# Patient Record
Sex: FEMALE | Race: WHITE | NOT HISPANIC OR LATINO | Employment: PART TIME | URBAN - METROPOLITAN AREA
[De-identification: names, ages, dates, MRNs, and addresses within clinical notes are randomized per-mention and may not be internally consistent; named-entity substitution may affect disease eponyms.]

---

## 2017-07-24 LAB — CRC RECOMMENDATION EXT: NORMAL

## 2017-08-03 ENCOUNTER — HISTORICAL (OUTPATIENT)
Dept: ADMINISTRATIVE | Facility: HOSPITAL | Age: 52
End: 2017-08-03

## 2017-11-01 ENCOUNTER — HISTORICAL (OUTPATIENT)
Dept: ADMINISTRATIVE | Facility: HOSPITAL | Age: 52
End: 2017-11-01

## 2018-10-30 ENCOUNTER — HISTORICAL (OUTPATIENT)
Dept: INTERNAL MEDICINE | Facility: CLINIC | Age: 53
End: 2018-10-30

## 2018-10-30 LAB
ABS NEUT (OLG): 3.09 X10(3)/MCL (ref 2.1–9.2)
ALBUMIN SERPL-MCNC: 3.7 GM/DL (ref 3.4–5)
ALBUMIN/GLOB SERPL: 1 RATIO (ref 1–2)
ALP SERPL-CCNC: 97 UNIT/L (ref 45–117)
ALT SERPL-CCNC: 19 UNIT/L (ref 12–78)
AST SERPL-CCNC: 19 UNIT/L (ref 15–37)
BASOPHILS # BLD AUTO: 0.02 X10(3)/MCL
BASOPHILS NFR BLD AUTO: 0 %
BILIRUB SERPL-MCNC: 0.3 MG/DL (ref 0.2–1)
BILIRUBIN DIRECT+TOT PNL SERPL-MCNC: 0.1 MG/DL
BILIRUBIN DIRECT+TOT PNL SERPL-MCNC: 0.2 MG/DL
BUN SERPL-MCNC: 10 MG/DL (ref 7–18)
CALCIUM SERPL-MCNC: 9.1 MG/DL (ref 8.5–10.1)
CHLORIDE SERPL-SCNC: 106 MMOL/L (ref 98–107)
CHOLEST SERPL-MCNC: 179 MG/DL
CHOLEST/HDLC SERPL: 2.6 {RATIO} (ref 0–4.4)
CO2 SERPL-SCNC: 30 MMOL/L (ref 21–32)
CREAT SERPL-MCNC: 0.6 MG/DL (ref 0.6–1.3)
EOSINOPHIL # BLD AUTO: 0.21 X10(3)/MCL
EOSINOPHIL NFR BLD AUTO: 4 %
ERYTHROCYTE [DISTWIDTH] IN BLOOD BY AUTOMATED COUNT: 12.5 % (ref 11.5–14.5)
GLOBULIN SER-MCNC: 4.6 GM/ML (ref 2.3–3.5)
GLUCOSE SERPL-MCNC: 99 MG/DL (ref 74–106)
HCT VFR BLD AUTO: 39.9 % (ref 35–46)
HDLC SERPL-MCNC: 69 MG/DL
HGB BLD-MCNC: 13.2 GM/DL (ref 12–16)
IMM GRANULOCYTES # BLD AUTO: 0.02 10*3/UL
IMM GRANULOCYTES NFR BLD AUTO: 0 %
LDLC SERPL CALC-MCNC: 92 MG/DL (ref 0–130)
LYMPHOCYTES # BLD AUTO: 1.77 X10(3)/MCL
LYMPHOCYTES NFR BLD AUTO: 32 % (ref 13–40)
MCH RBC QN AUTO: 29.1 PG (ref 26–34)
MCHC RBC AUTO-ENTMCNC: 33.1 GM/DL (ref 31–37)
MCV RBC AUTO: 87.9 FL (ref 80–100)
MONOCYTES # BLD AUTO: 0.5 X10(3)/MCL
MONOCYTES NFR BLD AUTO: 9 % (ref 4–12)
NEUTROPHILS # BLD AUTO: 3.09 X10(3)/MCL
NEUTROPHILS NFR BLD AUTO: 55 X10(3)/MCL
PLATELET # BLD AUTO: 278 X10(3)/MCL (ref 130–400)
PMV BLD AUTO: 10.3 FL (ref 7.4–10.4)
POTASSIUM SERPL-SCNC: 4 MMOL/L (ref 3.5–5.1)
PROT SERPL-MCNC: 8.3 GM/DL (ref 6.4–8.2)
RBC # BLD AUTO: 4.54 X10(6)/MCL (ref 4–5.2)
SODIUM SERPL-SCNC: 141 MMOL/L (ref 136–145)
TRIGL SERPL-MCNC: 90 MG/DL
VLDLC SERPL CALC-MCNC: 18 MG/DL
WBC # SPEC AUTO: 5.6 X10(3)/MCL (ref 4.5–11)

## 2018-11-06 ENCOUNTER — HISTORICAL (OUTPATIENT)
Dept: ADMINISTRATIVE | Facility: HOSPITAL | Age: 53
End: 2018-11-06

## 2019-08-07 ENCOUNTER — HISTORICAL (OUTPATIENT)
Dept: ADMINISTRATIVE | Facility: HOSPITAL | Age: 54
End: 2019-08-07

## 2019-10-31 ENCOUNTER — HISTORICAL (OUTPATIENT)
Dept: INTERNAL MEDICINE | Facility: CLINIC | Age: 54
End: 2019-10-31

## 2019-10-31 LAB
ALBUMIN SERPL-MCNC: 3.6 GM/DL (ref 3.4–5)
ALBUMIN/GLOB SERPL: 0.8 RATIO (ref 1.1–2)
ALP SERPL-CCNC: 76 UNIT/L (ref 45–117)
ALT SERPL-CCNC: 15 UNIT/L (ref 12–78)
AST SERPL-CCNC: 15 UNIT/L (ref 15–37)
BILIRUB SERPL-MCNC: 0.4 MG/DL (ref 0.2–1)
BILIRUBIN DIRECT+TOT PNL SERPL-MCNC: 0.1 MG/DL (ref 0–0.2)
BILIRUBIN DIRECT+TOT PNL SERPL-MCNC: 0.3 MG/DL
BUN SERPL-MCNC: 16 MG/DL (ref 7–18)
CALCIUM SERPL-MCNC: 9 MG/DL (ref 8.5–10.1)
CHLORIDE SERPL-SCNC: 105 MMOL/L (ref 98–107)
CHOLEST SERPL-MCNC: 258 MG/DL
CHOLEST/HDLC SERPL: 3.2 {RATIO} (ref 0–4.4)
CO2 SERPL-SCNC: 30 MMOL/L (ref 21–32)
CREAT SERPL-MCNC: 0.6 MG/DL (ref 0.6–1.3)
GLOBULIN SER-MCNC: 4.4 GM/ML (ref 2.3–3.5)
GLUCOSE SERPL-MCNC: 95 MG/DL (ref 74–106)
HDLC SERPL-MCNC: 80 MG/DL (ref 40–59)
LDLC SERPL CALC-MCNC: 166 MG/DL
POTASSIUM SERPL-SCNC: 4.4 MMOL/L (ref 3.5–5.1)
PROT SERPL-MCNC: 8 GM/DL (ref 6.4–8.2)
SODIUM SERPL-SCNC: 138 MMOL/L (ref 136–145)
TRIGL SERPL-MCNC: 62 MG/DL
VLDLC SERPL CALC-MCNC: 12 MG/DL

## 2020-07-23 ENCOUNTER — HISTORICAL (OUTPATIENT)
Dept: LAB | Facility: HOSPITAL | Age: 55
End: 2020-07-23

## 2020-07-23 LAB
ABS NEUT (OLG): 2.63 X10(3)/MCL (ref 2.1–9.2)
ALBUMIN SERPL-MCNC: 3.4 GM/DL (ref 3.4–5)
ALBUMIN/GLOB SERPL: 0.8 RATIO (ref 1.1–2)
ALP SERPL-CCNC: 87 UNIT/L (ref 45–117)
ALT SERPL-CCNC: 18 UNIT/L (ref 12–78)
AST SERPL-CCNC: 14 UNIT/L (ref 15–37)
BASOPHILS # BLD AUTO: 0 X10(3)/MCL (ref 0–0.2)
BASOPHILS NFR BLD AUTO: 0 %
BILIRUB SERPL-MCNC: 0.5 MG/DL (ref 0.2–1)
BILIRUBIN DIRECT+TOT PNL SERPL-MCNC: 0.2 MG/DL (ref 0–0.2)
BILIRUBIN DIRECT+TOT PNL SERPL-MCNC: 0.3 MG/DL
BUN SERPL-MCNC: 12 MG/DL (ref 7–18)
CALCIUM SERPL-MCNC: 9 MG/DL (ref 8.5–10.1)
CHLORIDE SERPL-SCNC: 108 MMOL/L (ref 98–107)
CHOLEST SERPL-MCNC: 177 MG/DL
CHOLEST/HDLC SERPL: 2.5 {RATIO} (ref 0–4.4)
CO2 SERPL-SCNC: 29 MMOL/L (ref 21–32)
CREAT SERPL-MCNC: 0.5 MG/DL (ref 0.6–1.3)
EOSINOPHIL # BLD AUTO: 0.1 X10(3)/MCL (ref 0–0.9)
EOSINOPHIL NFR BLD AUTO: 3 %
ERYTHROCYTE [DISTWIDTH] IN BLOOD BY AUTOMATED COUNT: 13.2 % (ref 11.5–14.5)
GLOBULIN SER-MCNC: 4.4 GM/ML (ref 2.3–3.5)
GLUCOSE SERPL-MCNC: 101 MG/DL (ref 74–106)
HCT VFR BLD AUTO: 38.8 % (ref 35–46)
HDLC SERPL-MCNC: 70 MG/DL (ref 40–59)
HGB BLD-MCNC: 12.4 GM/DL (ref 12–16)
IMM GRANULOCYTES # BLD AUTO: 0.01 10*3/UL
IMM GRANULOCYTES NFR BLD AUTO: 0 %
LDLC SERPL CALC-MCNC: 90 MG/DL
LYMPHOCYTES # BLD AUTO: 1.5 X10(3)/MCL (ref 0.6–4.6)
LYMPHOCYTES NFR BLD AUTO: 32 %
MCH RBC QN AUTO: 28.6 PG (ref 26–34)
MCHC RBC AUTO-ENTMCNC: 32 GM/DL (ref 31–37)
MCV RBC AUTO: 89.6 FL (ref 80–100)
MONOCYTES # BLD AUTO: 0.5 X10(3)/MCL (ref 0.1–1.3)
MONOCYTES NFR BLD AUTO: 11 %
NEUTROPHILS # BLD AUTO: 2.63 X10(3)/MCL (ref 2.1–9.2)
NEUTROPHILS NFR BLD AUTO: 54 %
PLATELET # BLD AUTO: 291 X10(3)/MCL (ref 130–400)
PMV BLD AUTO: 10.4 FL (ref 7.4–10.4)
POTASSIUM SERPL-SCNC: 4.2 MMOL/L (ref 3.5–5.1)
PROT SERPL-MCNC: 7.8 GM/DL (ref 6.4–8.2)
RBC # BLD AUTO: 4.33 X10(6)/MCL (ref 4–5.2)
SODIUM SERPL-SCNC: 139 MMOL/L (ref 136–145)
TRIGL SERPL-MCNC: 83 MG/DL
VLDLC SERPL CALC-MCNC: 17 MG/DL
WBC # SPEC AUTO: 4.9 X10(3)/MCL (ref 4.5–11)

## 2020-08-18 ENCOUNTER — HISTORICAL (OUTPATIENT)
Dept: ADMINISTRATIVE | Facility: HOSPITAL | Age: 55
End: 2020-08-18

## 2021-03-15 ENCOUNTER — HISTORICAL (OUTPATIENT)
Dept: LAB | Facility: HOSPITAL | Age: 56
End: 2021-03-15

## 2021-03-15 LAB
ABS NEUT (OLG): 2.82 X10(3)/MCL (ref 2.1–9.2)
ALBUMIN SERPL-MCNC: 3.6 GM/DL (ref 3.5–5)
ALBUMIN/GLOB SERPL: 0.9 RATIO (ref 1.1–2)
ALP SERPL-CCNC: 94 UNIT/L (ref 40–150)
ALT SERPL-CCNC: 11 UNIT/L (ref 0–55)
AST SERPL-CCNC: 18 UNIT/L (ref 5–34)
BASOPHILS # BLD AUTO: 0 X10(3)/MCL (ref 0–0.2)
BASOPHILS NFR BLD AUTO: 1 %
BILIRUB SERPL-MCNC: 0.5 MG/DL
BILIRUBIN DIRECT+TOT PNL SERPL-MCNC: 0.2 MG/DL (ref 0–0.5)
BILIRUBIN DIRECT+TOT PNL SERPL-MCNC: 0.3 MG/DL (ref 0–0.8)
BUN SERPL-MCNC: 10.8 MG/DL (ref 9.8–20.1)
CALCIUM SERPL-MCNC: 9.1 MG/DL (ref 8.4–10.2)
CHLORIDE SERPL-SCNC: 104 MMOL/L (ref 98–107)
CHOLEST SERPL-MCNC: 197 MG/DL
CHOLEST/HDLC SERPL: 3 {RATIO} (ref 0–5)
CO2 SERPL-SCNC: 28 MMOL/L (ref 22–29)
CREAT SERPL-MCNC: 0.65 MG/DL (ref 0.55–1.02)
DEPRECATED CALCIDIOL+CALCIFEROL SERPL-MC: 23.3 NG/ML (ref 30–80)
EOSINOPHIL # BLD AUTO: 0.2 X10(3)/MCL (ref 0–0.9)
EOSINOPHIL NFR BLD AUTO: 4 %
ERYTHROCYTE [DISTWIDTH] IN BLOOD BY AUTOMATED COUNT: 12.7 % (ref 11.5–14.5)
GLOBULIN SER-MCNC: 4.1 GM/DL (ref 2.4–3.5)
GLUCOSE SERPL-MCNC: 107 MG/DL (ref 74–100)
HCT VFR BLD AUTO: 46.1 % (ref 35–46)
HDLC SERPL-MCNC: 65 MG/DL (ref 35–60)
HGB BLD-MCNC: 15.1 GM/DL (ref 12–16)
IMM GRANULOCYTES # BLD AUTO: 0.01 10*3/UL
IMM GRANULOCYTES NFR BLD AUTO: 0 %
LDLC SERPL CALC-MCNC: 115 MG/DL (ref 50–140)
LYMPHOCYTES # BLD AUTO: 1.4 X10(3)/MCL (ref 0.6–4.6)
LYMPHOCYTES NFR BLD AUTO: 27 %
MCH RBC QN AUTO: 29.4 PG (ref 26–34)
MCHC RBC AUTO-ENTMCNC: 32.8 GM/DL (ref 31–37)
MCV RBC AUTO: 89.9 FL (ref 80–100)
MONOCYTES # BLD AUTO: 0.6 X10(3)/MCL (ref 0.1–1.3)
MONOCYTES NFR BLD AUTO: 12 %
NEUTROPHILS # BLD AUTO: 2.82 X10(3)/MCL (ref 2.1–9.2)
NEUTROPHILS NFR BLD AUTO: 56 %
PLATELET # BLD AUTO: 249 X10(3)/MCL (ref 130–400)
PMV BLD AUTO: 10.6 FL (ref 7.4–10.4)
POTASSIUM SERPL-SCNC: 4.4 MMOL/L (ref 3.5–5.1)
PROT SERPL-MCNC: 7.7 GM/DL (ref 6.4–8.3)
RBC # BLD AUTO: 5.13 X10(6)/MCL (ref 4–5.2)
SODIUM SERPL-SCNC: 139 MMOL/L (ref 136–145)
TRIGL SERPL-MCNC: 84 MG/DL (ref 37–140)
VLDLC SERPL CALC-MCNC: 17 MG/DL
WBC # SPEC AUTO: 5 X10(3)/MCL (ref 4.5–11)

## 2021-09-16 LAB
PAP RECOMMENDATION EXT: NORMAL
PAP SMEAR: NORMAL

## 2021-09-20 ENCOUNTER — HISTORICAL (OUTPATIENT)
Dept: LAB | Facility: HOSPITAL | Age: 56
End: 2021-09-20

## 2021-09-20 LAB
ALBUMIN SERPL-MCNC: 3.7 GM/DL (ref 3.5–5)
ALBUMIN/GLOB SERPL: 0.8 RATIO (ref 1.1–2)
ALP SERPL-CCNC: 102 UNIT/L (ref 40–150)
ALT SERPL-CCNC: 15 UNIT/L (ref 0–55)
AST SERPL-CCNC: 19 UNIT/L (ref 5–34)
BILIRUB SERPL-MCNC: 0.4 MG/DL
BILIRUBIN DIRECT+TOT PNL SERPL-MCNC: 0.1 MG/DL (ref 0–0.5)
BILIRUBIN DIRECT+TOT PNL SERPL-MCNC: 0.3 MG/DL (ref 0–0.8)
BUN SERPL-MCNC: 10.7 MG/DL (ref 9.8–20.1)
CALCIUM SERPL-MCNC: 10 MG/DL (ref 8.4–10.2)
CHLORIDE SERPL-SCNC: 104 MMOL/L (ref 98–107)
CHOLEST SERPL-MCNC: 202 MG/DL
CHOLEST/HDLC SERPL: 3 {RATIO} (ref 0–5)
CO2 SERPL-SCNC: 28 MMOL/L (ref 22–29)
CREAT SERPL-MCNC: 0.68 MG/DL (ref 0.55–1.02)
DEPRECATED CALCIDIOL+CALCIFEROL SERPL-MC: 56.4 NG/ML (ref 30–80)
GLOBULIN SER-MCNC: 4.4 GM/DL (ref 2.4–3.5)
GLUCOSE SERPL-MCNC: 101 MG/DL (ref 74–100)
HDLC SERPL-MCNC: 60 MG/DL (ref 35–60)
LDLC SERPL CALC-MCNC: 121 MG/DL (ref 50–140)
POTASSIUM SERPL-SCNC: 4.5 MMOL/L (ref 3.5–5.1)
PROT SERPL-MCNC: 8.1 GM/DL (ref 6.4–8.3)
SODIUM SERPL-SCNC: 141 MMOL/L (ref 136–145)
TRIGL SERPL-MCNC: 106 MG/DL (ref 37–140)
VLDLC SERPL CALC-MCNC: 21 MG/DL

## 2021-12-08 ENCOUNTER — HISTORICAL (OUTPATIENT)
Dept: ADMINISTRATIVE | Facility: HOSPITAL | Age: 56
End: 2021-12-08

## 2022-02-23 ENCOUNTER — HISTORICAL (OUTPATIENT)
Dept: ADMINISTRATIVE | Facility: HOSPITAL | Age: 57
End: 2022-02-23

## 2022-03-25 ENCOUNTER — HISTORICAL (OUTPATIENT)
Dept: ADMINISTRATIVE | Facility: HOSPITAL | Age: 57
End: 2022-03-25

## 2022-03-25 LAB
ABS NEUT (OLG): 3.35 (ref 2.1–9.2)
ALBUMIN SERPL-MCNC: 3.6 G/DL (ref 3.5–5)
ALBUMIN/GLOB SERPL: 0.9 {RATIO} (ref 1.1–2)
ALP SERPL-CCNC: 86 U/L (ref 40–150)
ALT SERPL-CCNC: 13 U/L (ref 0–55)
APPEARANCE, UA: CLEAR
AST SERPL-CCNC: 16 U/L (ref 5–34)
BACTERIA SPEC CULT: NORMAL
BASOPHILS # BLD AUTO: 0 10*3/UL (ref 0–0.2)
BASOPHILS NFR BLD AUTO: 1 %
BILIRUB SERPL-MCNC: 0.5 MG/DL
BILIRUB UR QL STRIP: NEGATIVE
BILIRUBIN DIRECT+TOT PNL SERPL-MCNC: 0.2 (ref 0–0.5)
BILIRUBIN DIRECT+TOT PNL SERPL-MCNC: 0.3 (ref 0–0.8)
BUN SERPL-MCNC: 10.1 MG/DL (ref 9.8–20.1)
CALCIUM SERPL-MCNC: 9.3 MG/DL (ref 8.7–10.5)
CHLORIDE SERPL-SCNC: 105 MMOL/L (ref 98–107)
CHOLEST SERPL-MCNC: 184 MG/DL
CHOLEST/HDLC SERPL: 3 {RATIO} (ref 0–5)
CO2 SERPL-SCNC: 25 MMOL/L (ref 22–29)
COLOR UR: NORMAL
CREAT SERPL-MCNC: 0.68 MG/DL (ref 0.55–1.02)
EOSINOPHIL # BLD AUTO: 0.3 10*3/UL (ref 0–0.9)
EOSINOPHIL NFR BLD AUTO: 5 %
ERYTHROCYTE [DISTWIDTH] IN BLOOD BY AUTOMATED COUNT: 13.2 % (ref 11.5–14.5)
FLAG2 (OHS): 70
FLAG3 (OHS): 80
FLAGS (OHS): 80
GLOBULIN SER-MCNC: 4.1 G/DL (ref 2.4–3.5)
GLUCOSE (UA): NORMAL
GLUCOSE SERPL-MCNC: 102 MG/DL (ref 74–100)
HCT VFR BLD AUTO: 39.3 % (ref 35–46)
HDLC SERPL-MCNC: 67 MG/DL (ref 35–60)
HEMOLYSIS INTERF INDEX SERPL-ACNC: 9
HGB BLD-MCNC: 12.9 G/DL (ref 12–16)
HGB UR QL STRIP: NEGATIVE
HYALINE CASTS #/AREA URNS LPF: NORMAL /[LPF]
ICTERIC INTERF INDEX SERPL-ACNC: 1
IMM GRANULOCYTES # BLD AUTO: 0.01 10*3/UL
IMM GRANULOCYTES NFR BLD AUTO: 0 %
IMM. NE 2 SUSPECT FLAG (OHS): 30
KETONES UR QL STRIP: NEGATIVE
LDLC SERPL CALC-MCNC: 106 MG/DL (ref 50–140)
LEUKOCYTE ESTERASE UR QL STRIP: NEGATIVE
LIPEMIC INTERF INDEX SERPL-ACNC: 2
LOW EVENT # SUSPECT FLAG (OHS): 80
LYMPHOCYTES # BLD AUTO: 1.6 10*3/UL (ref 0.6–4.6)
LYMPHOCYTES NFR BLD AUTO: 28 %
MANUAL DIFF? (OHS): NO
MCH RBC QN AUTO: 28.9 PG (ref 26–34)
MCHC RBC AUTO-ENTMCNC: 32.8 G/DL (ref 31–37)
MCV RBC AUTO: 87.9 FL (ref 80–100)
MO BLASTS SUSPECT FLAG (OHS): 30
MONOCYTES # BLD AUTO: 0.5 10*3/UL (ref 0.1–1.3)
MONOCYTES NFR BLD AUTO: 8 %
MUCOUS THREADS URNS QL MICRO: NORMAL
NEUTROPHILS # BLD AUTO: 3.35 10*3/UL (ref 2.1–9.2)
NEUTROPHILS NFR BLD AUTO: 58 %
NITRITE UR QL STRIP: NEGATIVE
NRBC BLD AUTO-RTO: 0 % (ref 0–0.2)
PH UR STRIP: 6.5 [PH] (ref 4.5–8)
PLATELET # BLD AUTO: 300 10*3/UL (ref 130–400)
PLATELET CLUMPS SUSPECT FLAG (OHS): 80
PMV BLD AUTO: 10.7 FL (ref 7.4–10.4)
POTASSIUM SERPL-SCNC: 3.8 MMOL/L (ref 3.5–5.1)
PROT SERPL-MCNC: 7.7 G/DL (ref 6.4–8.3)
PROT UR QL STRIP: NEGATIVE
RBC # BLD AUTO: 4.47 10*6/UL (ref 4–5.2)
RBC #/AREA URNS HPF: NORMAL /[HPF] (ref 0–5)
SODIUM SERPL-SCNC: 136 MMOL/L (ref 136–145)
SP GR UR STRIP: 1.01 (ref 1–1.03)
SQUAMOUS EPITHELIAL, UA: NORMAL
TRIGL SERPL-MCNC: 57 MG/DL (ref 37–140)
UROBILINOGEN UR STRIP-ACNC: NORMAL
VLDLC SERPL CALC-MCNC: 11 MG/DL
WBC # SPEC AUTO: 5.8 10*3/UL (ref 4.5–11)
WBC #/AREA URNS HPF: NORMAL /[HPF] (ref 0–5)

## 2022-04-09 ENCOUNTER — HISTORICAL (OUTPATIENT)
Dept: ADMINISTRATIVE | Facility: HOSPITAL | Age: 57
End: 2022-04-09
Payer: MEDICAID

## 2022-04-27 VITALS
BODY MASS INDEX: 29.17 KG/M2 | HEIGHT: 65 IN | DIASTOLIC BLOOD PRESSURE: 65 MMHG | WEIGHT: 175.06 LBS | SYSTOLIC BLOOD PRESSURE: 111 MMHG

## 2022-04-30 NOTE — PROGRESS NOTES
Patient:   Winter Ortez            MRN: [id number]            FIN: [id number]               Age:   53 years     Sex:  Female     :  1965   Associated Diagnoses:   None   Author:   Garrett AMIN, Judy Stallings reviewed: Will discuss with patient during follow up appointment on  2018 at 9:30am.

## 2022-07-23 ENCOUNTER — PATIENT OUTREACH (OUTPATIENT)
Dept: ADMINISTRATIVE | Facility: HOSPITAL | Age: 57
End: 2022-07-23
Payer: MEDICAID

## 2022-07-23 NOTE — PROGRESS NOTES
Population Health. Out Reach.  The following record(s)  below were uploaded for Health Maintenance .              9/16/21 PAP SMEAR

## 2022-08-22 ENCOUNTER — OFFICE VISIT (OUTPATIENT)
Dept: INTERNAL MEDICINE | Facility: CLINIC | Age: 57
End: 2022-08-22
Payer: MEDICAID

## 2022-08-22 VITALS
WEIGHT: 172 LBS | SYSTOLIC BLOOD PRESSURE: 112 MMHG | HEIGHT: 65 IN | RESPIRATION RATE: 18 BRPM | DIASTOLIC BLOOD PRESSURE: 71 MMHG | TEMPERATURE: 99 F | BODY MASS INDEX: 28.66 KG/M2 | HEART RATE: 67 BPM

## 2022-08-22 DIAGNOSIS — E78.49 OTHER HYPERLIPIDEMIA: Chronic | ICD-10-CM

## 2022-08-22 DIAGNOSIS — Z00.00 WELLNESS EXAMINATION: ICD-10-CM

## 2022-08-22 DIAGNOSIS — M54.2 NECK PAIN: Primary | ICD-10-CM

## 2022-08-22 DIAGNOSIS — E55.9 VITAMIN D DEFICIENCY: Chronic | ICD-10-CM

## 2022-08-22 DIAGNOSIS — R73.01 IMPAIRED FASTING GLUCOSE: ICD-10-CM

## 2022-08-22 PROCEDURE — 99214 OFFICE O/P EST MOD 30 MIN: CPT | Mod: PBBFAC | Performed by: NURSE PRACTITIONER

## 2022-08-22 PROCEDURE — 1160F RVW MEDS BY RX/DR IN RCRD: CPT | Mod: CPTII,,, | Performed by: NURSE PRACTITIONER

## 2022-08-22 PROCEDURE — 3008F BODY MASS INDEX DOCD: CPT | Mod: CPTII,,, | Performed by: NURSE PRACTITIONER

## 2022-08-22 PROCEDURE — 1160F PR REVIEW ALL MEDS BY PRESCRIBER/CLIN PHARMACIST DOCUMENTED: ICD-10-PCS | Mod: CPTII,,, | Performed by: NURSE PRACTITIONER

## 2022-08-22 PROCEDURE — 3008F PR BODY MASS INDEX (BMI) DOCUMENTED: ICD-10-PCS | Mod: CPTII,,, | Performed by: NURSE PRACTITIONER

## 2022-08-22 PROCEDURE — 3074F SYST BP LT 130 MM HG: CPT | Mod: CPTII,,, | Performed by: NURSE PRACTITIONER

## 2022-08-22 PROCEDURE — 1159F MED LIST DOCD IN RCRD: CPT | Mod: CPTII,,, | Performed by: NURSE PRACTITIONER

## 2022-08-22 PROCEDURE — 99214 OFFICE O/P EST MOD 30 MIN: CPT | Mod: S$PBB,,, | Performed by: NURSE PRACTITIONER

## 2022-08-22 PROCEDURE — 1159F PR MEDICATION LIST DOCUMENTED IN MEDICAL RECORD: ICD-10-PCS | Mod: CPTII,,, | Performed by: NURSE PRACTITIONER

## 2022-08-22 PROCEDURE — 3078F DIAST BP <80 MM HG: CPT | Mod: CPTII,,, | Performed by: NURSE PRACTITIONER

## 2022-08-22 PROCEDURE — 3078F PR MOST RECENT DIASTOLIC BLOOD PRESSURE < 80 MM HG: ICD-10-PCS | Mod: CPTII,,, | Performed by: NURSE PRACTITIONER

## 2022-08-22 PROCEDURE — 99214 PR OFFICE/OUTPT VISIT, EST, LEVL IV, 30-39 MIN: ICD-10-PCS | Mod: S$PBB,,, | Performed by: NURSE PRACTITIONER

## 2022-08-22 PROCEDURE — 3074F PR MOST RECENT SYSTOLIC BLOOD PRESSURE < 130 MM HG: ICD-10-PCS | Mod: CPTII,,, | Performed by: NURSE PRACTITIONER

## 2022-08-22 RX ORDER — CYCLOBENZAPRINE HCL 5 MG
5 TABLET ORAL NIGHTLY PRN
Qty: 10 TABLET | Refills: 1 | Status: SHIPPED | OUTPATIENT
Start: 2022-08-22 | End: 2022-09-01

## 2022-08-22 RX ORDER — PRAVASTATIN SODIUM 20 MG/1
20 TABLET ORAL NIGHTLY
COMMUNITY
Start: 2022-04-06 | End: 2022-08-22 | Stop reason: SDUPTHER

## 2022-08-22 RX ORDER — PRAVASTATIN SODIUM 20 MG/1
20 TABLET ORAL NIGHTLY
Qty: 90 TABLET | Refills: 1 | Status: SHIPPED | OUTPATIENT
Start: 2022-08-22 | End: 2023-02-06 | Stop reason: SDUPTHER

## 2022-08-22 RX ORDER — CHOLECALCIFEROL (VITAMIN D3) 25 MCG
5000 TABLET ORAL DAILY
COMMUNITY

## 2022-08-22 RX ORDER — ASPIRIN 81 MG/1
81 TABLET ORAL DAILY
COMMUNITY

## 2022-08-22 NOTE — PROGRESS NOTES
Subjective:       Patient ID: Winter Ortez is a 57 y.o. female.    Chief Complaint: Arm Pain (C/O neck pain right arm pain  poor ROM . Has pressure to back of head.)    Patient has diagnosis of HLD, Vitamin D Deficiency, DJD. Patient seen in clinic today to establish care. Patient [facility]. Last appointment on 04/28/2022. Today, patient states neck pain that radiates to head, and right upper arm pain. Patient denies injury. States taking Ibuprofen/Tylenol as needed for pain, some relief but pain returns. Patient states pain for a couple months. States she thought she slept wrong at first but pain not improving. Patient denies any other acute complaints.     Patient followed by [facility]. Last appointment on 02/23/2022. Dx: Osteoarthritis of knees, bilateral: Discussed with patient diagnosis and treatment recommendations. Imaging: Radiological studies ordered, reviewed, and independently interpreted by me; Discussed with patient moderate DJD noted, no acute findings. Treatment plan: Reviewed and discussed conservative treatments for bilateral knee DJD. Patient without significant pain today so we will hold off on CSI. Encouraged to reduce BMI and continue home exercises daily. Discussed taking over-the-counter Tylenol as needed for mild symptom relief. Procedure: Discussed CSI v. VS series injections as treatment option in future if conservative measures do not improve symptoms. Activity: Activity as tolerated, modification as necessary; HEP 3-4xs/ week (handout given) to included aerobic conditioning with non-painful activity and ROM/STG exercises with TheraBand provided; stationary bike exercise recommended with RPM set at 80 or > with slow progression to goal of 40 minutes 3-4 times per week as tolerated. Therapy: none. Medication: First line OTC: adequate vitamin D/C intake, oral glucosamine 1500mg/day and daily acetaminophen 1000 mg three  times daily, Medication precautions given; continue medications as RX per PCP. RTC: PRN; call if symptoms return or worsen.       Mammogram: 2021, done with Dr. Berman (GYN)  Pap: 09/16/2021  Colonoscopy: 06/2017, will try to obtain report  Bone Density: 12/16/2014, normal  Medicare Wellness: N/A    Review of Systems   Constitutional: Negative.    HENT: Negative.    Eyes: Negative.    Respiratory: Negative.    Cardiovascular: Negative.    Gastrointestinal: Negative.    Endocrine: Negative.    Genitourinary: Negative.    Musculoskeletal: Positive for myalgias and neck pain.   Integumentary:  Negative.   Allergic/Immunologic: Negative.    Neurological: Negative.    Hematological: Negative.    Psychiatric/Behavioral: Negative.          Objective:      Physical Exam  Vitals reviewed.   Constitutional:       Appearance: Normal appearance.   HENT:      Head: Normocephalic and atraumatic.      Mouth/Throat:      Mouth: Mucous membranes are moist.      Pharynx: Oropharynx is clear.   Eyes:      Extraocular Movements: Extraocular movements intact.      Conjunctiva/sclera: Conjunctivae normal.      Pupils: Pupils are equal, round, and reactive to light.   Neck:     Cardiovascular:      Rate and Rhythm: Normal rate and regular rhythm.      Heart sounds: Normal heart sounds.   Pulmonary:      Effort: Pulmonary effort is normal.      Breath sounds: Normal breath sounds.   Abdominal:      General: Bowel sounds are normal.   Musculoskeletal:         General: Normal range of motion.      Cervical back: Normal range of motion.        Back:    Skin:     General: Skin is warm and dry.   Neurological:      Mental Status: She is alert and oriented to person, place, and time.   Psychiatric:         Mood and Affect: Mood normal.         Behavior: Behavior normal.         Assessment:       Problem List Items Addressed This Visit        Cardiac/Vascular    Other hyperlipidemia (Chronic)     Continue Pravastatin 20 mg Qpm  Weight loss  encouraged  Low fat/high fiber diet  Increase physical activity  Chol: 184  HDL: 67  Tri  LDL: 106           Relevant Orders    CBC Auto Differential    Comprehensive Metabolic Panel    Lipid Panel    Urinalysis, Reflex to Urine Culture Urine, Clean Catch    TSH       Endocrine    Vitamin D deficiency (Chronic)     Vitamin D level: 56.4  Continue vitamin D 5,000 units daily  Repeat Vitamin D level ordered           Relevant Orders    Vitamin D    Impaired fasting glucose     Glucose level: 102  HgbA1c ordered           Relevant Orders    Hemoglobin A1C       Orthopedic    Neck pain - Primary     Start Flexeril 5 mg Qhs as needed for pain  If no improvement, will order X-ray             Other Visit Diagnoses     Wellness examination        Relevant Orders    Hepatitis C antibody    HIV 1/2 Ag/Ab (4th Gen)          Plan:    Patient to follow up in 6 weeks to reassess neck pain, labs. Virtual Visit per patient request.

## 2022-08-22 NOTE — ASSESSMENT & PLAN NOTE
Continue Pravastatin 20 mg Qpm  Weight loss encouraged  Low fat/high fiber diet  Increase physical activity  Chol: 184  HDL: 67  Tri  LDL: 106

## 2022-09-30 ENCOUNTER — LAB VISIT (OUTPATIENT)
Dept: LAB | Facility: HOSPITAL | Age: 57
End: 2022-09-30
Attending: NURSE PRACTITIONER
Payer: MEDICAID

## 2022-09-30 DIAGNOSIS — E55.9 VITAMIN D DEFICIENCY: Chronic | ICD-10-CM

## 2022-09-30 DIAGNOSIS — E78.49 OTHER HYPERLIPIDEMIA: ICD-10-CM

## 2022-09-30 DIAGNOSIS — Z00.00 WELLNESS EXAMINATION: ICD-10-CM

## 2022-09-30 DIAGNOSIS — R73.01 IMPAIRED FASTING GLUCOSE: ICD-10-CM

## 2022-09-30 LAB
ALBUMIN SERPL-MCNC: 3.8 GM/DL (ref 3.5–5)
ALBUMIN/GLOB SERPL: 0.9 RATIO (ref 1.1–2)
ALP SERPL-CCNC: 102 UNIT/L (ref 40–150)
ALT SERPL-CCNC: 13 UNIT/L (ref 0–55)
APPEARANCE UR: CLEAR
AST SERPL-CCNC: 16 UNIT/L (ref 5–34)
BACTERIA #/AREA URNS AUTO: ABNORMAL /HPF
BASOPHILS # BLD AUTO: 0.04 X10(3)/MCL (ref 0–0.2)
BASOPHILS NFR BLD AUTO: 0.8 %
BILIRUB UR QL STRIP.AUTO: NEGATIVE MG/DL
BILIRUBIN DIRECT+TOT PNL SERPL-MCNC: 0.4 MG/DL
BUN SERPL-MCNC: 15.1 MG/DL (ref 9.8–20.1)
CALCIUM SERPL-MCNC: 10 MG/DL (ref 8.4–10.2)
CHLORIDE SERPL-SCNC: 104 MMOL/L (ref 98–107)
CHOLEST SERPL-MCNC: 192 MG/DL
CHOLEST/HDLC SERPL: 3 {RATIO} (ref 0–5)
CO2 SERPL-SCNC: 29 MMOL/L (ref 22–29)
COLOR UR AUTO: ABNORMAL
CREAT SERPL-MCNC: 0.7 MG/DL (ref 0.55–1.02)
DEPRECATED CALCIDIOL+CALCIFEROL SERPL-MC: 56.5 NG/ML (ref 30–80)
EOSINOPHIL # BLD AUTO: 0.22 X10(3)/MCL (ref 0–0.9)
EOSINOPHIL NFR BLD AUTO: 4.2 %
ERYTHROCYTE [DISTWIDTH] IN BLOOD BY AUTOMATED COUNT: 12.7 % (ref 11.5–17)
EST. AVERAGE GLUCOSE BLD GHB EST-MCNC: 114 MG/DL
GFR SERPLBLD CREATININE-BSD FMLA CKD-EPI: >60 MLS/MIN/1.73/M2
GLOBULIN SER-MCNC: 4.3 GM/DL (ref 2.4–3.5)
GLUCOSE SERPL-MCNC: 105 MG/DL (ref 74–100)
GLUCOSE UR QL STRIP.AUTO: NORMAL MG/DL
HBA1C MFR BLD: 5.6 %
HCT VFR BLD AUTO: 42.2 % (ref 37–47)
HCV AB SERPL QL IA: NONREACTIVE
HDLC SERPL-MCNC: 68 MG/DL (ref 35–60)
HGB BLD-MCNC: 13.4 GM/DL (ref 12–16)
HIV 1+2 AB+HIV1 P24 AG SERPL QL IA: NONREACTIVE
HYALINE CASTS #/AREA URNS LPF: ABNORMAL /LPF
IMM GRANULOCYTES # BLD AUTO: 0.01 X10(3)/MCL (ref 0–0.04)
IMM GRANULOCYTES NFR BLD AUTO: 0.2 %
KETONES UR QL STRIP.AUTO: NEGATIVE MG/DL
LDLC SERPL CALC-MCNC: 109 MG/DL (ref 50–140)
LEUKOCYTE ESTERASE UR QL STRIP.AUTO: NEGATIVE UNIT/L
LYMPHOCYTES # BLD AUTO: 1.47 X10(3)/MCL (ref 0.6–4.6)
LYMPHOCYTES NFR BLD AUTO: 28.3 %
MCH RBC QN AUTO: 28.6 PG (ref 27–31)
MCHC RBC AUTO-ENTMCNC: 31.8 MG/DL (ref 33–36)
MCV RBC AUTO: 90.2 FL (ref 80–94)
MONOCYTES # BLD AUTO: 0.36 X10(3)/MCL (ref 0.1–1.3)
MONOCYTES NFR BLD AUTO: 6.9 %
MUCOUS THREADS URNS QL MICRO: ABNORMAL /LPF
NEUTROPHILS # BLD AUTO: 3.1 X10(3)/MCL (ref 2.1–9.2)
NEUTROPHILS NFR BLD AUTO: 59.6 %
NITRITE UR QL STRIP.AUTO: NEGATIVE
NRBC BLD AUTO-RTO: 0 %
PH UR STRIP.AUTO: 5 [PH]
PLATELET # BLD AUTO: 274 X10(3)/MCL (ref 130–400)
PMV BLD AUTO: 11.2 FL (ref 7.4–10.4)
POTASSIUM SERPL-SCNC: 5.3 MMOL/L (ref 3.5–5.1)
PROT SERPL-MCNC: 8.1 GM/DL (ref 6.4–8.3)
PROT UR QL STRIP.AUTO: NEGATIVE MG/DL
RBC # BLD AUTO: 4.68 X10(6)/MCL (ref 4.2–5.4)
RBC #/AREA URNS AUTO: ABNORMAL /HPF
RBC UR QL AUTO: NEGATIVE UNIT/L
SODIUM SERPL-SCNC: 140 MMOL/L (ref 136–145)
SP GR UR STRIP.AUTO: 1.02
SQUAMOUS #/AREA URNS LPF: ABNORMAL /HPF
TRIGL SERPL-MCNC: 74 MG/DL (ref 37–140)
TSH SERPL-ACNC: 1.38 UIU/ML (ref 0.35–4.94)
UROBILINOGEN UR STRIP-ACNC: NORMAL MG/DL
VLDLC SERPL CALC-MCNC: 15 MG/DL
WBC # SPEC AUTO: 5.2 X10(3)/MCL (ref 4.5–11.5)
WBC #/AREA URNS AUTO: ABNORMAL /HPF

## 2022-09-30 PROCEDURE — 87389 HIV-1 AG W/HIV-1&-2 AB AG IA: CPT

## 2022-09-30 PROCEDURE — 83036 HEMOGLOBIN GLYCOSYLATED A1C: CPT

## 2022-09-30 PROCEDURE — 80053 COMPREHEN METABOLIC PANEL: CPT

## 2022-09-30 PROCEDURE — 82306 VITAMIN D 25 HYDROXY: CPT

## 2022-09-30 PROCEDURE — 80061 LIPID PANEL: CPT

## 2022-09-30 PROCEDURE — 36415 COLL VENOUS BLD VENIPUNCTURE: CPT

## 2022-09-30 PROCEDURE — 81001 URINALYSIS AUTO W/SCOPE: CPT

## 2022-09-30 PROCEDURE — 86803 HEPATITIS C AB TEST: CPT

## 2022-09-30 PROCEDURE — 85025 COMPLETE CBC W/AUTO DIFF WBC: CPT

## 2022-09-30 PROCEDURE — 84443 ASSAY THYROID STIM HORMONE: CPT

## 2022-10-05 ENCOUNTER — OFFICE VISIT (OUTPATIENT)
Dept: INTERNAL MEDICINE | Facility: CLINIC | Age: 57
End: 2022-10-05
Payer: MEDICAID

## 2022-10-05 DIAGNOSIS — M79.601 RIGHT ARM PAIN: Primary | ICD-10-CM

## 2022-10-05 DIAGNOSIS — E78.49 OTHER HYPERLIPIDEMIA: Chronic | ICD-10-CM

## 2022-10-05 DIAGNOSIS — M54.2 NECK PAIN: ICD-10-CM

## 2022-10-05 DIAGNOSIS — E55.9 VITAMIN D DEFICIENCY: Chronic | ICD-10-CM

## 2022-10-05 PROCEDURE — 1160F RVW MEDS BY RX/DR IN RCRD: CPT | Mod: CPTII,95,, | Performed by: NURSE PRACTITIONER

## 2022-10-05 PROCEDURE — 99213 PR OFFICE/OUTPT VISIT, EST, LEVL III, 20-29 MIN: ICD-10-PCS | Mod: FQ,95,, | Performed by: NURSE PRACTITIONER

## 2022-10-05 PROCEDURE — 1160F PR REVIEW ALL MEDS BY PRESCRIBER/CLIN PHARMACIST DOCUMENTED: ICD-10-PCS | Mod: CPTII,95,, | Performed by: NURSE PRACTITIONER

## 2022-10-05 PROCEDURE — 99213 OFFICE O/P EST LOW 20 MIN: CPT | Mod: FQ,95,, | Performed by: NURSE PRACTITIONER

## 2022-10-05 PROCEDURE — 1159F MED LIST DOCD IN RCRD: CPT | Mod: CPTII,95,, | Performed by: NURSE PRACTITIONER

## 2022-10-05 PROCEDURE — 1159F PR MEDICATION LIST DOCUMENTED IN MEDICAL RECORD: ICD-10-PCS | Mod: CPTII,95,, | Performed by: NURSE PRACTITIONER

## 2022-10-05 NOTE — ASSESSMENT & PLAN NOTE
Continue Pravastatin 20 mg Qpm  Weight loss encouraged  Low fat/high fiber diet  Increase physical activity  Chol: 192  HDL: 68  Tri  LDL: 109

## 2022-10-05 NOTE — PROGRESS NOTES
Subjective:       Patient ID: Winter Ortez is a 57 y.o. female.    Chief Complaint: Follow-up and Hand Pain (Continue to have hand pain. Neck has improved )    Established Patient - Audio Only Telehealth Visit    The patient location is: home  The chief complaint leading to consultation is: right arm pain  Visit type: Virtual visit with audio only (telephone)  Total time spent with patient: 18 minutes    The reason for the audio only service rather than synchronous audio and video virtual visit was related to technical difficulties or patient preference/necessity.    Each patient to whom I provide medical services by telemedicine is:  (1) informed of the relationship between the physician and patient and the respective role of any other health care provider with respect to management of the patient; and (2) notified that they may decline to receive medical services by telemedicine and may withdraw from such care at any time. Patient verbally consented to receive this service via voice-only telephone call.     This service was not originating from a related E/M service provided within the previous 7 days nor will  to an E/M service or procedure within the next 24 hours or my soonest available appointment.  Prevailing standard of care was able to be met in this audio-only visit.          Patient has diagnosis of HLD, Vitamin D Deficiency, DJD. Patient seen today per audio visit for follow up on neck pain. Patient last seen in clinic on 08/22/2022 for neck and right upper arm pain. Patient prescribed Flexeril.  States Flexeril helped with neck pain but not her arm pain. States pain more to the middle of upper arm. Patient denies injury, weakness. States some decreased ROM. No recent X-rays noted. Patient denies any other acute complaints.      Patient followed by [facility] Clinic. Last appointment on 02/23/2022. Dx: Osteoarthritis of knees, bilateral: Discussed with patient diagnosis and treatment  recommendations. Imaging: Radiological studies ordered, reviewed, and independently interpreted by me; Discussed with patient moderate DJD noted, no acute findings. Treatment plan: Reviewed and discussed conservative treatments for bilateral knee DJD. Patient without significant pain today so we will hold off on CSI. Encouraged to reduce BMI and continue home exercises daily. Discussed taking over-the-counter Tylenol as needed for mild symptom relief. Procedure: Discussed CSI v. VS series injections as treatment option in future if conservative measures do not improve symptoms. Activity: Activity as tolerated, modification as necessary; HEP 3-4xs/ week (handout given) to included aerobic conditioning with non-painful activity and ROM/STG exercises with TheraBand provided; stationary bike exercise recommended with RPM set at 80 or > with slow progression to goal of 40 minutes 3-4 times per week as tolerated. Therapy: none. Medication: First line OTC: adequate vitamin D/C intake, oral glucosamine 1500mg/day and daily acetaminophen 1000 mg three times daily, Medication precautions given; continue medications as RX per PCP. RTC: PRN; call if symptoms return or worsen.         Mammogram: 2021, done with Dr. Berman (GYN)  Pap: 09/16/2021  Colonoscopy: 06/2017, will try to obtain report  Bone Density: 12/16/2014, normal  Medicare Wellness: N/A    Review of Systems   Constitutional: Negative.    HENT: Negative.     Eyes: Negative.    Respiratory: Negative.     Cardiovascular: Negative.    Gastrointestinal: Negative.    Endocrine: Negative.    Genitourinary: Negative.    Musculoskeletal:  Positive for arthralgias.   Integumentary:  Negative.   Allergic/Immunologic: Negative.    Neurological: Negative.    Hematological: Negative.    Psychiatric/Behavioral: Negative.         Objective:      Physical Exam  Neurological:      Mental Status: She is alert and oriented to person, place, and time.   Psychiatric:         Mood and  Affect: Mood normal.       Assessment:       Problem List Items Addressed This Visit          Cardiac/Vascular    Other hyperlipidemia (Chronic)     Continue Pravastatin 20 mg Qpm  Weight loss encouraged  Low fat/high fiber diet  Increase physical activity  Chol: 192  HDL: 68  Tri  LDL: 109            Endocrine    Vitamin D deficiency (Chronic)     Vitamin D level: 56.5  Continue Vitamin D3 supplement OTC            Orthopedic    Neck pain     Improved         Right arm pain - Primary     Right Shoulder/Humerus X-ray ordered         Relevant Orders    X-ray Shoulder 2 or More Views Right    X-Ray Humerus 2 View Right       Plan:    Patient to follow up in 4 months with X-rays to be done prior to appointment to follow up on arm pain, decreased ROM

## 2022-10-06 ENCOUNTER — HOSPITAL ENCOUNTER (OUTPATIENT)
Dept: RADIOLOGY | Facility: HOSPITAL | Age: 57
Discharge: HOME | End: 2022-10-06
Attending: NURSE PRACTITIONER
Payer: MEDICAID

## 2022-10-06 DIAGNOSIS — M79.601 RIGHT ARM PAIN: ICD-10-CM

## 2022-10-06 PROCEDURE — 73030 X-RAY EXAM OF SHOULDER: CPT | Mod: TC,RT

## 2022-10-06 PROCEDURE — 73060 X-RAY EXAM OF HUMERUS: CPT | Mod: TC,RT

## 2022-10-07 ENCOUNTER — PATIENT MESSAGE (OUTPATIENT)
Dept: INTERNAL MEDICINE | Facility: CLINIC | Age: 57
End: 2022-10-07
Payer: MEDICAID

## 2022-10-13 DIAGNOSIS — M25.511 CHRONIC RIGHT SHOULDER PAIN: Primary | ICD-10-CM

## 2022-10-13 DIAGNOSIS — G89.29 CHRONIC RIGHT SHOULDER PAIN: Primary | ICD-10-CM

## 2022-10-13 NOTE — TELEPHONE ENCOUNTER
Contact pt at number listed in the chart. Patient verbalized understanding. Pt states that she would like to be referred to [facility] on [address] and if you can give her a call to discuss further into the x-ray

## 2022-11-28 DIAGNOSIS — E87.5 HYPERKALEMIA: Primary | ICD-10-CM

## 2023-02-03 ENCOUNTER — LAB VISIT (OUTPATIENT)
Dept: LAB | Facility: HOSPITAL | Age: 58
End: 2023-02-03
Attending: NURSE PRACTITIONER
Payer: MEDICAID

## 2023-02-03 DIAGNOSIS — E87.5 HYPERKALEMIA: ICD-10-CM

## 2023-02-03 LAB
ALBUMIN SERPL-MCNC: 3.8 G/DL (ref 3.5–5)
ALBUMIN/GLOB SERPL: 0.9 RATIO (ref 1.1–2)
ALP SERPL-CCNC: 105 UNIT/L (ref 40–150)
ALT SERPL-CCNC: 9 UNIT/L (ref 0–55)
AST SERPL-CCNC: 15 UNIT/L (ref 5–34)
BILIRUBIN DIRECT+TOT PNL SERPL-MCNC: 0.5 MG/DL
BUN SERPL-MCNC: 13.4 MG/DL (ref 9.8–20.1)
CALCIUM SERPL-MCNC: 10.1 MG/DL (ref 8.4–10.2)
CHLORIDE SERPL-SCNC: 103 MMOL/L (ref 98–107)
CO2 SERPL-SCNC: 27 MMOL/L (ref 22–29)
CREAT SERPL-MCNC: 0.71 MG/DL (ref 0.55–1.02)
GFR SERPLBLD CREATININE-BSD FMLA CKD-EPI: >60 MLS/MIN/1.73/M2
GLOBULIN SER-MCNC: 4.2 GM/DL (ref 2.4–3.5)
GLUCOSE SERPL-MCNC: 105 MG/DL (ref 74–100)
POTASSIUM SERPL-SCNC: 3.9 MMOL/L (ref 3.5–5.1)
PROT SERPL-MCNC: 8 GM/DL (ref 6.4–8.3)
SODIUM SERPL-SCNC: 140 MMOL/L (ref 136–145)

## 2023-02-03 PROCEDURE — 80053 COMPREHEN METABOLIC PANEL: CPT

## 2023-02-03 PROCEDURE — 36415 COLL VENOUS BLD VENIPUNCTURE: CPT

## 2023-02-06 ENCOUNTER — OFFICE VISIT (OUTPATIENT)
Dept: INTERNAL MEDICINE | Facility: CLINIC | Age: 58
End: 2023-02-06
Payer: MEDICAID

## 2023-02-06 VITALS
RESPIRATION RATE: 18 BRPM | DIASTOLIC BLOOD PRESSURE: 76 MMHG | HEIGHT: 65 IN | HEART RATE: 76 BPM | TEMPERATURE: 99 F | SYSTOLIC BLOOD PRESSURE: 116 MMHG | BODY MASS INDEX: 29.46 KG/M2 | WEIGHT: 176.81 LBS

## 2023-02-06 DIAGNOSIS — M79.601 RIGHT ARM PAIN: Primary | ICD-10-CM

## 2023-02-06 DIAGNOSIS — Z00.00 WELLNESS EXAMINATION: ICD-10-CM

## 2023-02-06 DIAGNOSIS — E78.49 OTHER HYPERLIPIDEMIA: Chronic | ICD-10-CM

## 2023-02-06 DIAGNOSIS — R06.02 SOB (SHORTNESS OF BREATH): ICD-10-CM

## 2023-02-06 PROCEDURE — 99214 OFFICE O/P EST MOD 30 MIN: CPT | Mod: PBBFAC | Performed by: NURSE PRACTITIONER

## 2023-02-06 PROCEDURE — 1160F PR REVIEW ALL MEDS BY PRESCRIBER/CLIN PHARMACIST DOCUMENTED: ICD-10-PCS | Mod: CPTII,,, | Performed by: NURSE PRACTITIONER

## 2023-02-06 PROCEDURE — 1160F RVW MEDS BY RX/DR IN RCRD: CPT | Mod: CPTII,,, | Performed by: NURSE PRACTITIONER

## 2023-02-06 PROCEDURE — 3078F PR MOST RECENT DIASTOLIC BLOOD PRESSURE < 80 MM HG: ICD-10-PCS | Mod: CPTII,,, | Performed by: NURSE PRACTITIONER

## 2023-02-06 PROCEDURE — 3074F SYST BP LT 130 MM HG: CPT | Mod: CPTII,,, | Performed by: NURSE PRACTITIONER

## 2023-02-06 PROCEDURE — 1159F PR MEDICATION LIST DOCUMENTED IN MEDICAL RECORD: ICD-10-PCS | Mod: CPTII,,, | Performed by: NURSE PRACTITIONER

## 2023-02-06 PROCEDURE — 3078F DIAST BP <80 MM HG: CPT | Mod: CPTII,,, | Performed by: NURSE PRACTITIONER

## 2023-02-06 PROCEDURE — 99214 OFFICE O/P EST MOD 30 MIN: CPT | Mod: S$PBB,,, | Performed by: NURSE PRACTITIONER

## 2023-02-06 PROCEDURE — 1159F MED LIST DOCD IN RCRD: CPT | Mod: CPTII,,, | Performed by: NURSE PRACTITIONER

## 2023-02-06 PROCEDURE — 3008F PR BODY MASS INDEX (BMI) DOCUMENTED: ICD-10-PCS | Mod: CPTII,,, | Performed by: NURSE PRACTITIONER

## 2023-02-06 PROCEDURE — 3074F PR MOST RECENT SYSTOLIC BLOOD PRESSURE < 130 MM HG: ICD-10-PCS | Mod: CPTII,,, | Performed by: NURSE PRACTITIONER

## 2023-02-06 PROCEDURE — 3008F BODY MASS INDEX DOCD: CPT | Mod: CPTII,,, | Performed by: NURSE PRACTITIONER

## 2023-02-06 PROCEDURE — 99214 PR OFFICE/OUTPT VISIT, EST, LEVL IV, 30-39 MIN: ICD-10-PCS | Mod: S$PBB,,, | Performed by: NURSE PRACTITIONER

## 2023-02-06 RX ORDER — PRAVASTATIN SODIUM 20 MG/1
20 TABLET ORAL NIGHTLY
Qty: 90 TABLET | Refills: 2 | Status: SHIPPED | OUTPATIENT
Start: 2023-02-06 | End: 2023-08-04 | Stop reason: SDUPTHER

## 2023-02-06 NOTE — PROGRESS NOTES
Patient ID: [id number]     Chief Complaint: Follow-up, Arm Pain (Continue to have right arm pain. Did not do therapy.Request another referral./SOB started 2 weeks ago.), and Shortness of Breath    HPI:     Winter Ortez is a 57 y.o. female with diagnosis of HLD. Patient seen in clinic today for follow up on hyperkalemia. Patient last seen in clinic on 10/05/2022.   Previous Potassium level 5.3. Current Potassium level 3.9.   Patient states SOB at times, states not sure if it is anxiousness or related to her heart. Patient denies chest pain. Patient states SOB occurs with activity.   Patient currently on Pravastatin 20 mg daily for HLD. States taking medication daily as prescribed and tolerating well.   Patient previously seen for neck and right upper arm pain. Patient prescribed Flexeril, states Flexeril helped with neck pain but not her arm pain. Right Shoulder X-ray completed on 10/06/2022; indicated no acute bony abnormality. X-ray of Right Humerus completed on 10/06/2022; indicated no bony abnormality. Patient referred to Physical Therapy but states did not attend. Patient request another referral to Physical Therapy. States pain more to the middle of upper arm. Patient denies injury, weakness. States some decreased ROM. Patient denies any other acute complaints.      Patient followed by [facility]opedic Clinic. Last appointment on 02/23/2022. Dx: Osteoarthritis of knees, bilateral: Discussed with patient diagnosis and treatment recommendations. Imaging: Radiological studies ordered, reviewed, and independently interpreted by me; Discussed with patient moderate DJD noted, no acute findings. Treatment plan: Reviewed and discussed conservative treatments for bilateral knee DJD. Patient without significant pain today so we will hold off on CSI. Encouraged to reduce BMI and continue home exercises daily. Discussed taking over-the-counter Tylenol as needed for mild symptom relief. Procedure: Discussed CSI v. VS series  injections as treatment option in future if conservative measures do not improve symptoms. Activity: Activity as tolerated, modification as necessary; HEP 3-4xs/ week (handout given) to included aerobic conditioning with non-painful activity and ROM/STG exercises with TheraBand provided; stationary bike exercise recommended with RPM set at 80 or > with slow progression to goal of 40 minutes 3-4 times per week as tolerated. Therapy: none. Medication: First line OTC: adequate vitamin D/C intake, oral glucosamine 1500mg/day and daily acetaminophen 1000 mg three times daily, Medication precautions given; continue medications as RX per PCP. RTC: PRN; call if symptoms return or worsen.       Review of patient's allergies indicates:  No Known Allergies      Breast Cancer Screenin per Dr. Berman (GYN, will try to obtain  Cervical Cancer Screenin2021  Colorectal Cancer Screenin2017 per Dr. David Epps, will try to obtain  Diabetic Eye Exam: N/A  Diabetic Foot Exam: N/A  Lung Cancer Screening: N/A  Prostate Cancer Screening: N/A  AAA Screening: N/A  Osteoporosis Screening: deferred due to age  Medicare Wellness: N/A  Immunizations:   Immunization History   Administered Date(s) Administered    COVID-19, MRNA, LN-S, PF (Pfizer) (Purple Cap) 2021, 2021    Tdap 10/31/2019       Past Surgical History:   Procedure Laterality Date    APPENDECTOMY         family history includes Alzheimer's disease in her father; Diabetes in her brother and father; Ovarian cancer in her mother.    Social History     Socioeconomic History    Marital status:     Number of children: 3   Tobacco Use    Smoking status: Never    Smokeless tobacco: Never   Substance and Sexual Activity    Alcohol use: Never    Drug use: Never    Sexual activity: Yes     Partners: Male       Current Outpatient Medications   Medication Instructions    aspirin (ECOTRIN) 81 mg, Oral, Daily    pravastatin (PRAVACHOL) 20 mg, Oral,  "Nightly    vitamin D (VITAMIN D3) 5,000 Units, Oral, Daily       Subjective:     Review of Systems   Constitutional: Negative.    HENT: Negative.     Eyes: Negative.    Respiratory: Negative.     Cardiovascular: Negative.    Gastrointestinal: Negative.    Endocrine: Negative.    Genitourinary: Negative.    Musculoskeletal:  Positive for arthralgias.   Skin: Negative.    Allergic/Immunologic: Negative.    Neurological: Negative.    Hematological: Negative.    Psychiatric/Behavioral: Negative.       Objective:     Visit Vitals  /76 (BP Location: Left arm, Patient Position: Sitting, BP Method: Large (Automatic))   Pulse 76   Temp 98.5 °F (36.9 °C) (Oral)   Resp 18   Ht 5' 5.35" (1.66 m)   Wt 80.2 kg (176 lb 12.8 oz)   BMI 29.10 kg/m²     Physical Exam  Vitals reviewed.   Constitutional:       Appearance: Normal appearance.   HENT:      Head: Normocephalic and atraumatic.      Mouth/Throat:      Mouth: Mucous membranes are moist.      Pharynx: Oropharynx is clear.   Eyes:      Extraocular Movements: Extraocular movements intact.      Conjunctiva/sclera: Conjunctivae normal.      Pupils: Pupils are equal, round, and reactive to light.   Cardiovascular:      Rate and Rhythm: Normal rate and regular rhythm.      Heart sounds: Normal heart sounds.   Pulmonary:      Effort: Pulmonary effort is normal.      Breath sounds: Normal breath sounds.   Abdominal:      General: Bowel sounds are normal.   Musculoskeletal:         General: Normal range of motion.      Cervical back: Normal range of motion.   Skin:     General: Skin is warm and dry.   Neurological:      Mental Status: She is alert and oriented to person, place, and time.   Psychiatric:         Mood and Affect: Mood normal.         Behavior: Behavior normal.       Labs Reviewed:     Hematology:  Lab Results   Component Value Date    WBC 5.2 09/30/2022    HGB 13.4 09/30/2022    HCT 42.2 09/30/2022     09/30/2022     Chemistry:  Lab Results   Component Value " Date     02/03/2023    K 3.9 02/03/2023    CHLORIDE 103 02/03/2023    BUN 13.4 02/03/2023    CREATININE 0.71 02/03/2023    EGFRNORACEVR >60 02/03/2023    GLUCOSE 105 (H) 02/03/2023    CALCIUM 10.1 02/03/2023    ALKPHOS 105 02/03/2023    LABPROT 8.0 02/03/2023    ALBUMIN 3.8 02/03/2023    BILIDIR 0.2 03/25/2022    IBILI 0.30 03/25/2022    AST 15 02/03/2023    ALT 9 02/03/2023    [id number] 56.5 09/30/2022     Lab Results   Component Value Date    HGBA1C 5.6 09/30/2022     Lipid Panel:  Lab Results   Component Value Date    CHOL 192 09/30/2022    HDL 68 (H) 09/30/2022    .00 09/30/2022    TRIG 74 09/30/2022    TOTALCHOLEST 3 09/30/2022     Thyroid:  Lab Results   Component Value Date    TSH 1.3804 09/30/2022     Urine:  Lab Results   Component Value Date    COLORUA Light-Yellow (A) 09/30/2022    APPEARANCEUA Clear 09/30/2022    SGUA 1.022 09/30/2022    PHUA 5.0 09/30/2022    PROTEINUA Negative 09/30/2022    GLUCOSEUA Normal 09/30/2022    KETONESUA Negative 09/30/2022    BLOODUA Negative 09/30/2022    NITRITESUA Negative 09/30/2022    LEUKOCYTESUR Negative 09/30/2022    RBCUA 0-5 09/30/2022    WBCUA 0-5 09/30/2022    BACTERIA None Seen 09/30/2022    SQEPUA Trace (A) 09/30/2022    HYALINECASTS 0-2 (A) 09/30/2022        Assessment:       ICD-10-CM ICD-9-CM   1. Right arm pain  M79.601 729.5   2. SOB (shortness of breath)  R06.02 786.05   3. Other hyperlipidemia  E78.49 272.4   4. Wellness examination  Z00.00 V70.0        Plan:     1. Right arm pain  - Ambulatory referral/consult to Physical/Occupational Therapy; Future    2. SOB (shortness of breath)  - SCHEDULED EKG 12-LEAD (to [facility]); Future    3. Other hyperlipidemia  Continue Pravastatin 20 mg daily  Weight loss encouraged  Low fat/high fiber diet  Increase physical activity  Cholesterol Total   Date Value Ref Range Status   09/30/2022 192 <=200 mg/dL Final     HDL Cholesterol   Date Value Ref Range Status   09/30/2022 68 (H) 35 - 60 mg/dL Final      Triglyceride   Date Value Ref Range Status   09/30/2022 74 37 - 140 mg/dL Final     LDL Cholesterol   Date Value Ref Range Status   09/30/2022 109.00 50.00 - 140.00 mg/dL Final       - CBC Auto Differential; Future  - Comprehensive Metabolic Panel; Future  - Hemoglobin A1C; Future  - Lipid Panel; Future  - Vitamin D; Future  - Urinalysis; Future  - TSH; Future  - Urinalysis      Follow up in about 6 months (around 8/6/2023) for Labs. In addition to their scheduled follow up, the patient has also been instructed to follow up on as needed basis.     PAOLO Hsu

## 2023-02-07 ENCOUNTER — HOSPITAL ENCOUNTER (OUTPATIENT)
Dept: CARDIOLOGY | Facility: HOSPITAL | Age: 58
Discharge: HOME | End: 2023-02-07
Attending: NURSE PRACTITIONER
Payer: MEDICAID

## 2023-02-07 DIAGNOSIS — R06.02 SOB (SHORTNESS OF BREATH): ICD-10-CM

## 2023-02-07 PROCEDURE — 93005 ELECTROCARDIOGRAM TRACING: CPT

## 2023-02-08 ENCOUNTER — PATIENT MESSAGE (OUTPATIENT)
Dept: ADMINISTRATIVE | Facility: HOSPITAL | Age: 58
End: 2023-02-08
Payer: MEDICAID

## 2023-02-09 ENCOUNTER — TELEPHONE (OUTPATIENT)
Dept: INTERNAL MEDICINE | Facility: CLINIC | Age: 58
End: 2023-02-09
Payer: MEDICAID

## 2023-02-09 NOTE — TELEPHONE ENCOUNTER
----- Message from PAOLO Hsu sent at 2/8/2023  2:55 PM CST -----  Please notify patient that EKG normal.

## 2023-02-10 ENCOUNTER — TELEPHONE (OUTPATIENT)
Dept: INTERNAL MEDICINE | Facility: CLINIC | Age: 58
End: 2023-02-10
Payer: MEDICAID

## 2023-03-24 ENCOUNTER — DOCUMENTATION ONLY (OUTPATIENT)
Dept: INTERNAL MEDICINE | Facility: CLINIC | Age: 58
End: 2023-03-24
Payer: MEDICAID

## 2023-06-14 ENCOUNTER — PATIENT MESSAGE (OUTPATIENT)
Dept: ADMINISTRATIVE | Facility: HOSPITAL | Age: 58
End: 2023-06-14
Payer: MEDICAID

## 2023-06-29 DIAGNOSIS — Z12.31 ENCOUNTER FOR SCREENING MAMMOGRAM FOR MALIGNANT NEOPLASM OF BREAST: Primary | ICD-10-CM

## 2023-07-31 ENCOUNTER — LAB VISIT (OUTPATIENT)
Dept: LAB | Facility: HOSPITAL | Age: 58
End: 2023-07-31
Attending: NURSE PRACTITIONER
Payer: MEDICAID

## 2023-07-31 DIAGNOSIS — Z00.00 WELLNESS EXAMINATION: ICD-10-CM

## 2023-07-31 LAB
ALBUMIN SERPL-MCNC: 3.6 G/DL (ref 3.5–5)
ALBUMIN/GLOB SERPL: 0.9 RATIO (ref 1.1–2)
ALP SERPL-CCNC: 100 UNIT/L (ref 40–150)
ALT SERPL-CCNC: 12 UNIT/L (ref 0–55)
AST SERPL-CCNC: 16 UNIT/L (ref 5–34)
BASOPHILS # BLD AUTO: 0.04 X10(3)/MCL
BASOPHILS NFR BLD AUTO: 0.6 %
BILIRUBIN DIRECT+TOT PNL SERPL-MCNC: 0.4 MG/DL
BUN SERPL-MCNC: 11.4 MG/DL (ref 9.8–20.1)
CALCIUM SERPL-MCNC: 9.4 MG/DL (ref 8.4–10.2)
CHLORIDE SERPL-SCNC: 106 MMOL/L (ref 98–107)
CHOLEST SERPL-MCNC: 194 MG/DL
CHOLEST/HDLC SERPL: 4 {RATIO} (ref 0–5)
CO2 SERPL-SCNC: 25 MMOL/L (ref 22–29)
CREAT SERPL-MCNC: 0.65 MG/DL (ref 0.55–1.02)
DEPRECATED CALCIDIOL+CALCIFEROL SERPL-MC: 56.1 NG/ML (ref 30–80)
EOSINOPHIL # BLD AUTO: 0.23 X10(3)/MCL (ref 0–0.9)
EOSINOPHIL NFR BLD AUTO: 3.4 %
ERYTHROCYTE [DISTWIDTH] IN BLOOD BY AUTOMATED COUNT: 13.1 % (ref 11.5–17)
EST. AVERAGE GLUCOSE BLD GHB EST-MCNC: 108.3 MG/DL
GFR SERPLBLD CREATININE-BSD FMLA CKD-EPI: >60 MLS/MIN/1.73/M2
GLOBULIN SER-MCNC: 3.9 GM/DL (ref 2.4–3.5)
GLUCOSE SERPL-MCNC: 108 MG/DL (ref 74–100)
HBA1C MFR BLD: 5.4 %
HCT VFR BLD AUTO: 38.8 % (ref 37–47)
HDLC SERPL-MCNC: 53 MG/DL (ref 35–60)
HGB BLD-MCNC: 12.7 G/DL (ref 12–16)
IMM GRANULOCYTES # BLD AUTO: 0.02 X10(3)/MCL (ref 0–0.04)
IMM GRANULOCYTES NFR BLD AUTO: 0.3 %
LDLC SERPL CALC-MCNC: 117 MG/DL (ref 50–140)
LYMPHOCYTES # BLD AUTO: 1.99 X10(3)/MCL (ref 0.6–4.6)
LYMPHOCYTES NFR BLD AUTO: 29.7 %
MCH RBC QN AUTO: 28.7 PG (ref 27–31)
MCHC RBC AUTO-ENTMCNC: 32.7 G/DL (ref 33–36)
MCV RBC AUTO: 87.6 FL (ref 80–94)
MONOCYTES # BLD AUTO: 0.54 X10(3)/MCL (ref 0.1–1.3)
MONOCYTES NFR BLD AUTO: 8 %
NEUTROPHILS # BLD AUTO: 3.89 X10(3)/MCL (ref 2.1–9.2)
NEUTROPHILS NFR BLD AUTO: 58 %
NRBC BLD AUTO-RTO: 0 %
PLATELET # BLD AUTO: 267 X10(3)/MCL (ref 130–400)
PMV BLD AUTO: 10.3 FL (ref 7.4–10.4)
POTASSIUM SERPL-SCNC: 4 MMOL/L (ref 3.5–5.1)
PROT SERPL-MCNC: 7.5 GM/DL (ref 6.4–8.3)
RBC # BLD AUTO: 4.43 X10(6)/MCL (ref 4.2–5.4)
SODIUM SERPL-SCNC: 141 MMOL/L (ref 136–145)
TRIGL SERPL-MCNC: 120 MG/DL (ref 37–140)
TSH SERPL-ACNC: 2.02 UIU/ML (ref 0.35–4.94)
VLDLC SERPL CALC-MCNC: 24 MG/DL
WBC # SPEC AUTO: 6.71 X10(3)/MCL (ref 4.5–11.5)

## 2023-07-31 PROCEDURE — 80061 LIPID PANEL: CPT

## 2023-07-31 PROCEDURE — 80053 COMPREHEN METABOLIC PANEL: CPT

## 2023-07-31 PROCEDURE — 82306 VITAMIN D 25 HYDROXY: CPT

## 2023-07-31 PROCEDURE — 83036 HEMOGLOBIN GLYCOSYLATED A1C: CPT

## 2023-07-31 PROCEDURE — 84443 ASSAY THYROID STIM HORMONE: CPT

## 2023-07-31 PROCEDURE — 85025 COMPLETE CBC W/AUTO DIFF WBC: CPT

## 2023-07-31 PROCEDURE — 36415 COLL VENOUS BLD VENIPUNCTURE: CPT

## 2023-08-04 ENCOUNTER — OFFICE VISIT (OUTPATIENT)
Dept: INTERNAL MEDICINE | Facility: CLINIC | Age: 58
End: 2023-08-04
Payer: MEDICAID

## 2023-08-04 VITALS
RESPIRATION RATE: 18 BRPM | HEIGHT: 65 IN | SYSTOLIC BLOOD PRESSURE: 102 MMHG | TEMPERATURE: 98 F | DIASTOLIC BLOOD PRESSURE: 69 MMHG | WEIGHT: 181.81 LBS | HEART RATE: 72 BPM | BODY MASS INDEX: 30.29 KG/M2

## 2023-08-04 DIAGNOSIS — E55.9 VITAMIN D DEFICIENCY: Chronic | ICD-10-CM

## 2023-08-04 DIAGNOSIS — R73.01 IMPAIRED FASTING GLUCOSE: ICD-10-CM

## 2023-08-04 DIAGNOSIS — E78.49 OTHER HYPERLIPIDEMIA: Primary | Chronic | ICD-10-CM

## 2023-08-04 PROCEDURE — 3074F SYST BP LT 130 MM HG: CPT | Mod: CPTII,,, | Performed by: NURSE PRACTITIONER

## 2023-08-04 PROCEDURE — 3078F DIAST BP <80 MM HG: CPT | Mod: CPTII,,, | Performed by: NURSE PRACTITIONER

## 2023-08-04 PROCEDURE — 99214 OFFICE O/P EST MOD 30 MIN: CPT | Mod: PBBFAC | Performed by: NURSE PRACTITIONER

## 2023-08-04 PROCEDURE — 3008F BODY MASS INDEX DOCD: CPT | Mod: CPTII,,, | Performed by: NURSE PRACTITIONER

## 2023-08-04 PROCEDURE — 1160F PR REVIEW ALL MEDS BY PRESCRIBER/CLIN PHARMACIST DOCUMENTED: ICD-10-PCS | Mod: CPTII,,, | Performed by: NURSE PRACTITIONER

## 2023-08-04 PROCEDURE — 3008F PR BODY MASS INDEX (BMI) DOCUMENTED: ICD-10-PCS | Mod: CPTII,,, | Performed by: NURSE PRACTITIONER

## 2023-08-04 PROCEDURE — 3044F PR MOST RECENT HEMOGLOBIN A1C LEVEL <7.0%: ICD-10-PCS | Mod: CPTII,,, | Performed by: NURSE PRACTITIONER

## 2023-08-04 PROCEDURE — 3078F PR MOST RECENT DIASTOLIC BLOOD PRESSURE < 80 MM HG: ICD-10-PCS | Mod: CPTII,,, | Performed by: NURSE PRACTITIONER

## 2023-08-04 PROCEDURE — 1160F RVW MEDS BY RX/DR IN RCRD: CPT | Mod: CPTII,,, | Performed by: NURSE PRACTITIONER

## 2023-08-04 PROCEDURE — 3074F PR MOST RECENT SYSTOLIC BLOOD PRESSURE < 130 MM HG: ICD-10-PCS | Mod: CPTII,,, | Performed by: NURSE PRACTITIONER

## 2023-08-04 PROCEDURE — 1159F MED LIST DOCD IN RCRD: CPT | Mod: CPTII,,, | Performed by: NURSE PRACTITIONER

## 2023-08-04 PROCEDURE — 99214 OFFICE O/P EST MOD 30 MIN: CPT | Mod: S$PBB,,, | Performed by: NURSE PRACTITIONER

## 2023-08-04 PROCEDURE — 1159F PR MEDICATION LIST DOCUMENTED IN MEDICAL RECORD: ICD-10-PCS | Mod: CPTII,,, | Performed by: NURSE PRACTITIONER

## 2023-08-04 PROCEDURE — 3044F HG A1C LEVEL LT 7.0%: CPT | Mod: CPTII,,, | Performed by: NURSE PRACTITIONER

## 2023-08-04 PROCEDURE — 99214 PR OFFICE/OUTPT VISIT, EST, LEVL IV, 30-39 MIN: ICD-10-PCS | Mod: S$PBB,,, | Performed by: NURSE PRACTITIONER

## 2023-08-04 RX ORDER — ERGOCALCIFEROL 1.25 MG/1
50000 CAPSULE ORAL WEEKLY
COMMUNITY
End: 2023-08-04 | Stop reason: ALTCHOICE

## 2023-08-04 RX ORDER — DICLOFENAC SODIUM 75 MG/1
1 TABLET, DELAYED RELEASE ORAL 2 TIMES DAILY
COMMUNITY
Start: 2023-07-30 | End: 2024-01-17 | Stop reason: SDUPTHER

## 2023-08-04 RX ORDER — METHYLPREDNISOLONE 4 MG/1
TABLET ORAL
Qty: 21 EACH | Refills: 0 | Status: SHIPPED | OUTPATIENT
Start: 2023-08-04 | End: 2024-01-17 | Stop reason: ALTCHOICE

## 2023-08-04 RX ORDER — PRAVASTATIN SODIUM 10 MG/1
10 TABLET ORAL NIGHTLY
Qty: 90 TABLET | Refills: 2 | Status: SHIPPED | OUTPATIENT
Start: 2023-08-04 | End: 2024-01-17

## 2023-08-04 RX ORDER — NICOTINE POLACRILEX 2 MG
1 GUM BUCCAL DAILY
COMMUNITY

## 2023-08-04 NOTE — PROGRESS NOTES
Patient ID: [id number]     Chief Complaint: Follow-up and Foot Pain    HPI:     Winter Ortez is a 58 y.o. female with diagnosis of HLD. Patient seen in clinic today for follow up on HLD. Patient last seen in clinic on 02/06/2023.   At previous appt, patient stated SOB at times with activity, denied chest pain. EKG done, negative.    Patient currently prescribed Pravastatin 20 mg daily for HLD. States taking medication daily as prescribed and tolerating well. Patient requesting to stop medication, would like to maintain with diet and exercise.   Patient previously seen for neck and right upper arm pain. Patient prescribed Flexeril, states Flexeril helped with neck pain but not her arm pain. Right Shoulder X-ray completed on 10/06/2022; indicated no acute bony abnormality. X-ray of Right Humerus completed on 10/06/2022; indicated no bony abnormality. Patient referred to Physical Therapy.  Today, patient states bilateral foot pain. States traveled overseas, walked a lot, started having pain when she returned. States pain to right heel and left arch. Patient denies injury. Patient denies taking medication for pain.   Patient concerned about weight gain. Denies cough, edema, SOB. Patient states she quit her job a few months ago and does not move as much as she used to. States she eats the same, has not had increase in appetite.   Patient denies any other acute complaints.      Patient followed by Orthopedic Clinic. Last appointment on 02/23/2022. Dx: Osteoarthritis of knees, bilateral: Discussed with patient diagnosis and treatment recommendations. Imaging: Radiological studies ordered, reviewed, and independently interpreted by me; Discussed with patient moderate DJD noted, no acute findings. Treatment plan: Reviewed and discussed conservative treatments for bilateral knee DJD. Patient without significant pain today so we will hold off on CSI. Encouraged to reduce BMI and continue home exercises daily. Discussed  taking over-the-counter Tylenol as needed for mild symptom relief. Procedure: Discussed CSI v. VS series injections as treatment option in future if conservative measures do not improve symptoms. Activity: Activity as tolerated, modification as necessary; HEP 3-4xs/ week (handout given) to included aerobic conditioning with non-painful activity and ROM/STG exercises with TheraBand provided; stationary bike exercise recommended with RPM set at 80 or > with slow progression to goal of 40 minutes 3-4 times per week as tolerated. Therapy: none. Medication: First line OTC: adequate vitamin D/C intake, oral glucosamine 1500mg/day and daily acetaminophen 1000 mg three times daily, Medication precautions given; continue medications as RX per PCP. RTC: PRN; call if symptoms return or worsen.     Review of patient's allergies indicates:  No Known Allergies    Breast Cancer Screenin per Dr. Berman (GYN, will try to obtain  Cervical Cancer Screenin2021  Colorectal Cancer Screenin2017 per Dr. David Epps, will try to obtain  Diabetic Eye Exam: N/A  Diabetic Foot Exam: N/A  Lung Cancer Screening: N/A  Prostate Cancer Screening: N/A  AAA Screening: N/A  Osteoporosis Screening: deferred due to age  Medicare Wellness: N/A  Immunizations:   Immunization History   Administered Date(s) Administered    COVID-19, MRNA, LN-S, PF (Pfizer) (Purple Cap) 2021, 2021    Tdap 10/31/2019     Past Surgical History:   Procedure Laterality Date    APPENDECTOMY       family history includes Alzheimer's disease in her father; Diabetes in her brother and father; Ovarian cancer in her mother.    Social History     Socioeconomic History    Marital status:     Number of children: 3   Tobacco Use    Smoking status: Never    Smokeless tobacco: Never   Substance and Sexual Activity    Alcohol use: Never    Drug use: Never    Sexual activity: Yes     Partners: Male     Current Outpatient Medications   Medication  "Instructions    aspirin (ECOTRIN) 81 mg, Oral, Daily    biotin 1 mg Cap 1 capsule, Oral, Daily    diclofenac (VOLTAREN) 75 MG EC tablet 1 tablet, Oral, 2 times daily    methylPREDNISolone (MEDROL DOSEPACK) 4 mg tablet use as directed    pravastatin (PRAVACHOL) 10 mg, Oral, Nightly    vitamin D (VITAMIN D3) 5,000 Units, Oral, Daily       Subjective:     Review of Systems   Constitutional: Negative.    HENT: Negative.     Eyes: Negative.    Respiratory: Negative.     Cardiovascular: Negative.    Gastrointestinal: Negative.    Endocrine: Negative.    Genitourinary: Negative.    Musculoskeletal:         Bilateral foot pain   Skin: Negative.    Allergic/Immunologic: Negative.    Neurological: Negative.    Hematological: Negative.    Psychiatric/Behavioral: Negative.         Objective:     Visit Vitals  /69 (BP Location: Left arm, Patient Position: Sitting, BP Method: Large (Automatic))   Pulse 72   Temp 98.1 °F (36.7 °C) (Oral)   Resp 18   Ht 5' 5.35" (1.66 m)   Wt 82.5 kg (181 lb 12.8 oz)   BMI 29.93 kg/m²     Physical Exam  Vitals reviewed.   Constitutional:       Appearance: Normal appearance.   HENT:      Head: Normocephalic and atraumatic.      Mouth/Throat:      Mouth: Mucous membranes are moist.      Pharynx: Oropharynx is clear.   Eyes:      Extraocular Movements: Extraocular movements intact.      Conjunctiva/sclera: Conjunctivae normal.      Pupils: Pupils are equal, round, and reactive to light.   Cardiovascular:      Rate and Rhythm: Normal rate and regular rhythm.      Heart sounds: Normal heart sounds.   Pulmonary:      Effort: Pulmonary effort is normal.      Breath sounds: Normal breath sounds.   Abdominal:      General: Bowel sounds are normal.   Musculoskeletal:         General: Normal range of motion.      Cervical back: Normal range of motion.   Skin:     General: Skin is warm and dry.   Neurological:      Mental Status: She is alert and oriented to person, place, and time.   Psychiatric:        "  Mood and Affect: Mood normal.         Behavior: Behavior normal.       Labs Reviewed:     Hematology:  Lab Results   Component Value Date    WBC 6.71 07/31/2023    HGB 12.7 07/31/2023    HCT 38.8 07/31/2023     07/31/2023     Chemistry:  Lab Results   Component Value Date     07/31/2023    K 4.0 07/31/2023    CHLORIDE 106 07/31/2023    BUN 11.4 07/31/2023    CREATININE 0.65 07/31/2023    EGFRNORACEVR >60 07/31/2023    GLUCOSE 108 (H) 07/31/2023    CALCIUM 9.4 07/31/2023    ALKPHOS 100 07/31/2023    LABPROT 7.5 07/31/2023    ALBUMIN 3.6 07/31/2023    BILIDIR 0.2 03/25/2022    IBILI 0.30 03/25/2022    AST 16 07/31/2023    ALT 12 07/31/2023    IYVRQNOR91EA 56.1 07/31/2023     Lab Results   Component Value Date    HGBA1C 5.4 07/31/2023     Lipid Panel:  Lab Results   Component Value Date    CHOL 194 07/31/2023    HDL 53 07/31/2023    .00 07/31/2023    TRIG 120 07/31/2023    TOTALCHOLEST 4 07/31/2023     Thyroid:  Lab Results   Component Value Date    TSH 2.017 07/31/2023     Urine:  Lab Results   Component Value Date    COLORUA Light-Yellow (A) 09/30/2022    APPEARANCEUA Clear 09/30/2022    SGUA 1.022 09/30/2022    PHUA 5.0 09/30/2022    PROTEINUA Negative 09/30/2022    GLUCOSEUA Normal 09/30/2022    KETONESUA Negative 09/30/2022    BLOODUA Negative 09/30/2022    NITRITESUA Negative 09/30/2022    LEUKOCYTESUR Negative 09/30/2022    RBCUA 0-5 09/30/2022    WBCUA 0-5 09/30/2022    BACTERIA None Seen 09/30/2022    SQEPUA Trace (A) 09/30/2022    HYALINECASTS 0-2 (A) 09/30/2022        Assessment:       ICD-10-CM ICD-9-CM   1. Other hyperlipidemia  E78.49 272.4   2. Vitamin D deficiency  E55.9 268.9   3. Impaired fasting glucose  R73.01 790.21        Plan:     1. Other hyperlipidemia  Decrease Pravastatin to 10 mg daily per patient request. Patient informed that if cholesterol levels increase, we will return to Pravastatin 20 mg daily. Patient states understanding.   Weight loss encouraged  Low fat/high  fiber diet  Increase physical activity  Cholesterol Total   Date Value Ref Range Status   07/31/2023 194 <=200 mg/dL Final     HDL Cholesterol   Date Value Ref Range Status   07/31/2023 53 35 - 60 mg/dL Final     Triglyceride   Date Value Ref Range Status   07/31/2023 120 37 - 140 mg/dL Final     LDL Cholesterol   Date Value Ref Range Status   07/31/2023 117.00 50.00 - 140.00 mg/dL Final   - Lipid Panel; Future    2. Vitamin D deficiency  Vitamin D level: 56.1  Continue Vitamin D OTC 5,000 units daily    3. Impaired fasting glucose  Glucose: 108  HgbA1c: 5.4      Follow up in about 4 months (around 12/4/2023) for Labs. In addition to their scheduled follow up, the patient has also been instructed to follow up on as needed basis.     Azul Giang, PAOLO

## 2023-09-19 ENCOUNTER — PATIENT MESSAGE (OUTPATIENT)
Dept: ADMINISTRATIVE | Facility: HOSPITAL | Age: 58
End: 2023-09-19
Payer: MEDICAID

## 2023-12-04 ENCOUNTER — LAB VISIT (OUTPATIENT)
Dept: LAB | Facility: HOSPITAL | Age: 58
End: 2023-12-04
Attending: NURSE PRACTITIONER
Payer: MEDICAID

## 2023-12-04 DIAGNOSIS — E78.49 OTHER HYPERLIPIDEMIA: Chronic | ICD-10-CM

## 2023-12-04 LAB
APPEARANCE UR: CLEAR
BACTERIA #/AREA URNS AUTO: ABNORMAL /HPF
BILIRUB UR QL STRIP.AUTO: NEGATIVE
CHOLEST SERPL-MCNC: 194 MG/DL
CHOLEST/HDLC SERPL: 3 {RATIO} (ref 0–5)
COLOR UR AUTO: ABNORMAL
GLUCOSE UR QL STRIP.AUTO: NORMAL
HDLC SERPL-MCNC: 69 MG/DL (ref 35–60)
HYALINE CASTS #/AREA URNS LPF: ABNORMAL /LPF
KETONES UR QL STRIP.AUTO: NEGATIVE
LDLC SERPL CALC-MCNC: 109 MG/DL (ref 50–140)
LEUKOCYTE ESTERASE UR QL STRIP.AUTO: NEGATIVE
MUCOUS THREADS URNS QL MICRO: ABNORMAL /LPF
NITRITE UR QL STRIP.AUTO: NEGATIVE
PH UR STRIP.AUTO: 6 [PH]
PROT UR QL STRIP.AUTO: NEGATIVE
RBC #/AREA URNS AUTO: ABNORMAL /HPF
RBC UR QL AUTO: NEGATIVE
SP GR UR STRIP.AUTO: 1.02 (ref 1–1.03)
SQUAMOUS #/AREA URNS LPF: ABNORMAL /HPF
TRIGL SERPL-MCNC: 78 MG/DL (ref 37–140)
UROBILINOGEN UR STRIP-ACNC: NORMAL
VLDLC SERPL CALC-MCNC: 16 MG/DL
WBC #/AREA URNS AUTO: ABNORMAL /HPF

## 2023-12-04 PROCEDURE — 36415 COLL VENOUS BLD VENIPUNCTURE: CPT

## 2023-12-04 PROCEDURE — 80061 LIPID PANEL: CPT

## 2023-12-19 ENCOUNTER — TELEPHONE (OUTPATIENT)
Dept: INTERNAL MEDICINE | Facility: CLINIC | Age: 58
End: 2023-12-19
Payer: MEDICAID

## 2023-12-19 NOTE — TELEPHONE ENCOUNTER
----- Message from PAOLO Hsu sent at 12/19/2023  3:13 PM CST -----  Please notify patient that Mammogram needed. Mammogram ordered, patient can call [phone] to have mammogram scheduled.

## 2023-12-19 NOTE — TELEPHONE ENCOUNTER
Pt stated her mammo is scheduled in January at [facility], advised pt to have them send the report.

## 2024-01-05 LAB — BCS RECOMMENDATION EXT: NORMAL

## 2024-01-17 ENCOUNTER — OFFICE VISIT (OUTPATIENT)
Dept: INTERNAL MEDICINE | Facility: CLINIC | Age: 59
End: 2024-01-17
Payer: MEDICAID

## 2024-01-17 VITALS
RESPIRATION RATE: 18 BRPM | DIASTOLIC BLOOD PRESSURE: 72 MMHG | TEMPERATURE: 98 F | WEIGHT: 177 LBS | HEART RATE: 87 BPM | BODY MASS INDEX: 29.49 KG/M2 | HEIGHT: 65 IN | SYSTOLIC BLOOD PRESSURE: 102 MMHG

## 2024-01-17 DIAGNOSIS — E55.9 VITAMIN D DEFICIENCY: Chronic | ICD-10-CM

## 2024-01-17 DIAGNOSIS — G47.00 INSOMNIA, UNSPECIFIED TYPE: ICD-10-CM

## 2024-01-17 DIAGNOSIS — R73.01 IMPAIRED FASTING GLUCOSE: ICD-10-CM

## 2024-01-17 DIAGNOSIS — E78.49 OTHER HYPERLIPIDEMIA: Primary | Chronic | ICD-10-CM

## 2024-01-17 PROCEDURE — 3078F DIAST BP <80 MM HG: CPT | Mod: CPTII,,, | Performed by: NURSE PRACTITIONER

## 2024-01-17 PROCEDURE — 99214 OFFICE O/P EST MOD 30 MIN: CPT | Mod: PBBFAC | Performed by: NURSE PRACTITIONER

## 2024-01-17 PROCEDURE — 1160F RVW MEDS BY RX/DR IN RCRD: CPT | Mod: CPTII,,, | Performed by: NURSE PRACTITIONER

## 2024-01-17 PROCEDURE — 3074F SYST BP LT 130 MM HG: CPT | Mod: CPTII,,, | Performed by: NURSE PRACTITIONER

## 2024-01-17 PROCEDURE — 3008F BODY MASS INDEX DOCD: CPT | Mod: CPTII,,, | Performed by: NURSE PRACTITIONER

## 2024-01-17 PROCEDURE — 1159F MED LIST DOCD IN RCRD: CPT | Mod: CPTII,,, | Performed by: NURSE PRACTITIONER

## 2024-01-17 PROCEDURE — 99214 OFFICE O/P EST MOD 30 MIN: CPT | Mod: S$PBB,,, | Performed by: NURSE PRACTITIONER

## 2024-01-17 RX ORDER — TRAZODONE HYDROCHLORIDE 50 MG/1
50 TABLET ORAL NIGHTLY PRN
Qty: 30 TABLET | Refills: 6 | Status: SHIPPED | OUTPATIENT
Start: 2024-01-17

## 2024-01-17 RX ORDER — HYDROCORTISONE 25 MG/G
CREAM TOPICAL
COMMUNITY
Start: 2023-11-06

## 2024-01-17 RX ORDER — DICLOFENAC SODIUM 75 MG/1
75 TABLET, DELAYED RELEASE ORAL 2 TIMES DAILY PRN
Qty: 20 TABLET | Refills: 1 | Status: SHIPPED | OUTPATIENT
Start: 2024-01-17 | End: 2024-02-19 | Stop reason: ALTCHOICE

## 2024-01-17 NOTE — PROGRESS NOTES
Patient ID: [id number]     Chief Complaint: Follow-up and Results (Continue to have toe pain.)    HPI:     Winter Ortez is a 58 y.o. female with diagnosis of HLD. Patient seen in clinic today for follow up. Patient last seen in clinic on 08/04/2023.   Today, patient states trouble staying asleep at night. Patient states taking melatonin, falls asleep easily but does not stay asleep.   Previously, patient requesting discontinuation of cholesterol medication. Patient currently prescribed Pravastatin 10 mg daily. Patient previously prescribed Pravastatin 20 mg daily, dose decreased to 10 mg daily, cholesterol levels WNL.   EKG completed on 02/07/2023 for SOB; negative.    Patient previously seen for neck and right upper arm pain. Patient prescribed Flexeril, states Flexeril helped with neck pain but not her arm pain. Right Shoulder X-ray completed on 10/06/2022; indicated no acute bony abnormality. X-ray of Right Humerus completed on 10/06/2022; indicated no bony abnormality. Patient referred to Physical Therapy, states improved.  Patient denies any other acute complaints.      Patient followed by Orthopedic Clinic. Last appointment on 02/23/2022. Dx: Osteoarthritis of knees, bilateral: Discussed with patient diagnosis and treatment recommendations. Imaging: Radiological studies ordered, reviewed, and independently interpreted by me; Discussed with patient moderate DJD noted, no acute findings. Treatment plan: Reviewed and discussed conservative treatments for bilateral knee DJD. Patient without significant pain today so we will hold off on CSI. Encouraged to reduce BMI and continue home exercises daily. Discussed taking over-the-counter Tylenol as needed for mild symptom relief. Procedure: Discussed CSI v. VS series injections as treatment option in future if conservative measures do not improve symptoms. Activity: Activity as tolerated, modification as necessary; HEP 3-4xs/ week (handout given) to included aerobic  conditioning with non-painful activity and ROM/STG exercises with TheraBand provided; stationary bike exercise recommended with RPM set at 80 or > with slow progression to goal of 40 minutes 3-4 times per week as tolerated. Therapy: none. Medication: First line OTC: adequate vitamin D/C intake, oral glucosamine 1500mg/day and daily acetaminophen 1000 mg three times daily, Medication precautions given; continue medications as RX per PCP. RTC: PRN; call if symptoms return or worsen.     Review of patient's allergies indicates:  No Known Allergies    Breast Cancer Screenin per Dr. Berman (GYN, will try to obtain  Cervical Cancer Screenin2021  Colorectal Cancer Screenin2017 per Dr. David Epps, will try to obtain  Diabetic Eye Exam: N/A  Diabetic Foot Exam: N/A  Lung Cancer Screening: N/A  Prostate Cancer Screening: N/A  AAA Screening: N/A  Osteoporosis Screening: deferred due to age  Medicare Wellness: N/A  Immunizations:   Immunization History   Administered Date(s) Administered    COVID-19, MRNA, LN-S, PF (Pfizer) (Purple Cap) 2021, 2021    Tdap 10/31/2019     Past Surgical History:   Procedure Laterality Date    APPENDECTOMY      DILATION AND CURETTAGE OF UTERUS  10/03/2023     family history includes Alzheimer's disease in her father; Diabetes in her brother and father; Ovarian cancer in her mother.    Social History     Socioeconomic History    Marital status:     Number of children: 3   Tobacco Use    Smoking status: Never    Smokeless tobacco: Never   Substance and Sexual Activity    Alcohol use: Never    Drug use: Never    Sexual activity: Yes     Partners: Male     Social Determinants of Health     Financial Resource Strain: Low Risk  (2024)    Overall Financial Resource Strain (CARDIA)     Difficulty of Paying Living Expenses: Not very hard   Food Insecurity: No Food Insecurity (2024)    Hunger Vital Sign     Worried About Running Out of Food in the Last  Year: Never true     Ran Out of Food in the Last Year: Never true   Transportation Needs: No Transportation Needs (1/17/2024)    PRAPARE - Transportation     Lack of Transportation (Medical): No     Lack of Transportation (Non-Medical): No   Physical Activity: Inactive (1/17/2024)    Exercise Vital Sign     Days of Exercise per Week: 0 days     Minutes of Exercise per Session: 0 min   Stress: Stress Concern Present (1/17/2024)    [employer] - Occupational Stress Questionnaire     Feeling of Stress : Rather much   Social Connections: Socially Isolated (1/17/2024)    Social Connection and Isolation Panel [NHANES]     Frequency of Communication with Friends and Family: More than three times a week     Frequency of Social Gatherings with Friends and Family: Once a week     Attends Restorationist Services: Never     Active Member of Clubs or Organizations: No     Attends Club or Organization Meetings: Never     Marital Status:    Housing Stability: Low Risk  (1/17/2024)    Housing Stability Vital Sign     Unable to Pay for Housing in the Last Year: No     Number of Places Lived in the Last Year: 1     Unstable Housing in the Last Year: No     Current Outpatient Medications   Medication Instructions    aspirin (ECOTRIN) 81 mg, Oral, Daily    biotin 1 mg Cap 1 capsule, Oral, Daily    diclofenac (VOLTAREN) 75 mg, Oral, 2 times daily PRN    hydrocortisone 2.5 % cream Topical (Top)    traZODone (DESYREL) 50 mg, Oral, Nightly PRN    vitamin D (VITAMIN D3) 5,000 Units, Oral, Daily       Subjective:     Review of Systems   Constitutional: Negative.    HENT: Negative.     Eyes: Negative.    Respiratory: Negative.     Cardiovascular: Negative.    Gastrointestinal: Negative.    Endocrine: Negative.    Genitourinary: Negative.    Musculoskeletal: Negative.    Skin: Negative.    Allergic/Immunologic: Negative.    Neurological: Negative.    Hematological: Negative.    Psychiatric/Behavioral: Negative.   "       Objective:     Visit Vitals  /72 (BP Location: Left arm, Patient Position: Sitting, BP Method: Large (Automatic))   Pulse 87   Temp 98.2 °F (36.8 °C) (Oral)   Resp 18   Ht 5' 5.35" (1.66 m)   Wt 80.3 kg (177 lb)   BMI 29.14 kg/m²       Physical Exam  Vitals reviewed.   Constitutional:       Appearance: Normal appearance.   HENT:      Head: Normocephalic and atraumatic.      Mouth/Throat:      Mouth: Mucous membranes are moist.      Pharynx: Oropharynx is clear.   Eyes:      Extraocular Movements: Extraocular movements intact.      Conjunctiva/sclera: Conjunctivae normal.      Pupils: Pupils are equal, round, and reactive to light.   Cardiovascular:      Rate and Rhythm: Normal rate and regular rhythm.      Heart sounds: Normal heart sounds.   Pulmonary:      Effort: Pulmonary effort is normal.      Breath sounds: Normal breath sounds.   Abdominal:      General: Bowel sounds are normal.   Musculoskeletal:         General: Normal range of motion.      Cervical back: Normal range of motion.   Skin:     General: Skin is warm and dry.   Neurological:      Mental Status: She is alert and oriented to person, place, and time.   Psychiatric:         Mood and Affect: Mood normal.         Behavior: Behavior normal.       Labs Reviewed:     Hematology:  Lab Results   Component Value Date    WBC 6.71 07/31/2023    HGB 12.7 07/31/2023    HCT 38.8 07/31/2023     07/31/2023     Chemistry:  Lab Results   Component Value Date     07/31/2023    K 4.0 07/31/2023    CHLORIDE 106 07/31/2023    BUN 11.4 07/31/2023    CREATININE 0.65 07/31/2023    EGFRNORACEVR >60 07/31/2023    GLUCOSE 108 (H) 07/31/2023    CALCIUM 9.4 07/31/2023    ALKPHOS 100 07/31/2023    LABPROT 7.5 07/31/2023    ALBUMIN 3.6 07/31/2023    BILIDIR 0.2 03/25/2022    IBILI 0.30 03/25/2022    AST 16 07/31/2023    ALT 12 07/31/2023    [id number] 56.1 07/31/2023     Lab Results   Component Value Date    HGBA1C 5.4 07/31/2023     Lipid Panel:  Lab " Results   Component Value Date    CHOL 194 12/04/2023    HDL 69 (H) 12/04/2023    .00 12/04/2023    TRIG 78 12/04/2023    TOTALCHOLEST 3 12/04/2023     Thyroid:  Lab Results   Component Value Date    TSH 2.017 07/31/2023     Urine:  Lab Results   Component Value Date    COLORUA Light-Yellow 12/04/2023    APPEARANCEUA Clear 12/04/2023    SGUA 1.022 12/04/2023    PHUA 6.0 12/04/2023    PROTEINUA Negative 12/04/2023    GLUCOSEUA Normal 12/04/2023    KETONESUA Negative 12/04/2023    BLOODUA Negative 12/04/2023    NITRITESUA Negative 12/04/2023    LEUKOCYTESUR Negative 12/04/2023    RBCUA 0-5 12/04/2023    WBCUA 0-5 12/04/2023    BACTERIA None Seen 12/04/2023    SQEPUA Trace (A) 12/04/2023    HYALINECASTS None Seen 12/04/2023        Assessment:       ICD-10-CM ICD-9-CM   1. Other hyperlipidemia  E78.49 272.4   2. Impaired fasting glucose  R73.01 790.21   3. Vitamin D deficiency  E55.9 268.9   4. Insomnia, unspecified type  G47.00 780.52       Plan:     1. Other hyperlipidemia  D/C Pravastatin  Weight loss encouraged  Low fat/high fiber diet  Increase physical activity  Cholesterol Total   Date Value Ref Range Status   12/04/2023 194 <=200 mg/dL Final     HDL Cholesterol   Date Value Ref Range Status   12/04/2023 69 (H) 35 - 60 mg/dL Final     Triglyceride   Date Value Ref Range Status   12/04/2023 78 37 - 140 mg/dL Final     LDL Cholesterol   Date Value Ref Range Status   12/04/2023 109.00 50.00 - 140.00 mg/dL Final   - Lipid Panel; Future    2. Impaired fasting glucose  Glucose: 108  HgbA1c: 5.4    3. Vitamin D deficiency  Vitamin D level: 56.1  Continue Vitamin D OTC 5,000 units daily    4. Insomnia, unspecified type  Start Trazodone   Notify clinic if no improvement      Follow up in about 4 months (around 5/17/2024) for Labs, Virtual Visit. In addition to their scheduled follow up, the patient has also been instructed to follow up on as needed basis.     Azul Giang, PARESHP

## 2024-02-19 ENCOUNTER — HOSPITAL ENCOUNTER (EMERGENCY)
Facility: HOSPITAL | Age: 59
Discharge: HOME | End: 2024-02-19
Attending: STUDENT IN AN ORGANIZED HEALTH CARE EDUCATION/TRAINING PROGRAM
Payer: MEDICAID

## 2024-02-19 VITALS
HEIGHT: 65 IN | TEMPERATURE: 98 F | DIASTOLIC BLOOD PRESSURE: 79 MMHG | RESPIRATION RATE: 20 BRPM | SYSTOLIC BLOOD PRESSURE: 139 MMHG | WEIGHT: 178 LBS | OXYGEN SATURATION: 100 % | BODY MASS INDEX: 29.66 KG/M2 | HEART RATE: 69 BPM

## 2024-02-19 DIAGNOSIS — M25.562 LEFT KNEE PAIN: ICD-10-CM

## 2024-02-19 DIAGNOSIS — M17.12 PRIMARY OSTEOARTHRITIS OF LEFT KNEE: Primary | ICD-10-CM

## 2024-02-19 PROCEDURE — 63600175 PHARM REV CODE 636 W HCPCS: Performed by: STUDENT IN AN ORGANIZED HEALTH CARE EDUCATION/TRAINING PROGRAM

## 2024-02-19 PROCEDURE — 96372 THER/PROPH/DIAG INJ SC/IM: CPT | Performed by: STUDENT IN AN ORGANIZED HEALTH CARE EDUCATION/TRAINING PROGRAM

## 2024-02-19 PROCEDURE — 99284 EMERGENCY DEPT VISIT MOD MDM: CPT | Mod: 25

## 2024-02-19 RX ORDER — KETOROLAC TROMETHAMINE 10 MG/1
10 TABLET, FILM COATED ORAL EVERY 6 HOURS PRN
Qty: 20 TABLET | Refills: 0 | Status: SHIPPED | OUTPATIENT
Start: 2024-02-19 | End: 2024-02-24

## 2024-02-19 RX ORDER — KETOROLAC TROMETHAMINE 30 MG/ML
30 INJECTION, SOLUTION INTRAMUSCULAR; INTRAVENOUS
Status: COMPLETED | OUTPATIENT
Start: 2024-02-19 | End: 2024-02-19

## 2024-02-19 RX ADMIN — KETOROLAC TROMETHAMINE 30 MG: 30 INJECTION, SOLUTION INTRAMUSCULAR; INTRAVENOUS at 10:02

## 2024-02-19 NOTE — Clinical Note
"Winter Hubbardhair" Pérez was seen and treated in our emergency department on 2/19/2024.  She may return to work on 02/21/2024.       If you have any questions or concerns, please don't hesitate to call.      Scott Daniels MD"

## 2024-02-19 NOTE — Clinical Note
"Winter Stevens (Suhair)bossman was seen and treated in our emergency department on 2/19/2024.  She may return to work on 02/21/2024.       If you have any questions or concerns, please don't hesitate to call.       RN    "

## 2024-02-19 NOTE — ED PROVIDER NOTES
Encounter Date: 2/19/2024       History     Chief Complaint   Patient presents with    Knee Pain     Reports of sharp pain to her left knee. Denies injury/trauma. Hx arthritis, but states this feels different.      HPI    58-year-old female with a past medical history left knee osteoarthritis as well as prediabetes presents emergency department for left knee pain.  States it has been aching more over last few days.  No fall or trauma no swelling or redness.    Review of patient's allergies indicates:  No Known Allergies  No past medical history on file.  Past Surgical History:   Procedure Laterality Date    APPENDECTOMY      DILATION AND CURETTAGE OF UTERUS  10/03/2023     Family History   Problem Relation Age of Onset    Ovarian cancer Mother     Alzheimer's disease Father     Diabetes Father     Diabetes Brother      Social History     Tobacco Use    Smoking status: Never    Smokeless tobacco: Never   Substance Use Topics    Alcohol use: Never    Drug use: Never     Review of Systems   Constitutional:  Negative for fever.   Respiratory:  Negative for cough.    Cardiovascular:  Negative for chest pain.   Gastrointestinal:  Negative for abdominal pain, constipation, diarrhea, nausea and vomiting.   Musculoskeletal:  Positive for arthralgias.   Neurological:  Negative for headaches.   All other systems reviewed and are negative.      Physical Exam     Initial Vitals [02/19/24 0922]   BP Pulse Resp Temp SpO2   139/79 69 20 97.7 °F (36.5 °C) 100 %      MAP       --         Physical Exam    Nursing note and vitals reviewed.  Constitutional: She appears well-developed and well-nourished. No distress.   Cardiovascular:  Normal rate and regular rhythm.           Pulmonary/Chest: Breath sounds normal. No respiratory distress. She has no wheezes. She has no rhonchi. She has no rales.   Abdominal: Abdomen is soft. There is no abdominal tenderness. There is no rebound and no guarding.   Musculoskeletal:      Right knee:  Normal.      Left knee: Crepitus present. No swelling, deformity, effusion, erythema, ecchymosis, lacerations or bony tenderness. Decreased range of motion. Tenderness present over the medial joint line and lateral joint line.     Neurological: She is alert and oriented to person, place, and time. She has normal strength.   Skin: Skin is warm. Capillary refill takes less than 2 seconds.         ED Course   Procedures  Labs Reviewed - No data to display       Imaging Results              X-Ray Knee 3 View Left (Preliminary result)  Result time 02/19/24 09:44:27      Wet Read by Scott Daniels MD (02/19/24 09:44:27, [facility] - Emergency Dept, Emergency Medicine)    Arthritic changes                                     Medications   ketorolac injection 30 mg (has no administration in time range)     Medical Decision Making  differential diagnosis  Osteoarthritis, fracture, contusion,  as well as multiple other possible etiologies      Problems Addressed:  Primary osteoarthritis of left knee: chronic illness or injury    Amount and/or Complexity of Data Reviewed  Radiology: ordered and independent interpretation performed.    Risk  Prescription drug management.                                      Clinical Impression:  Final diagnoses:  [M25.562] Left knee pain  [M17.12] Primary osteoarthritis of left knee (Primary)          ED Disposition Condition    Discharge Stable          ED Prescriptions       Medication Sig Dispense Start Date End Date Auth. Provider    ketorolac (TORADOL) 10 mg tablet Take 1 tablet (10 mg total) by mouth every 6 (six) hours as needed for Pain. 20 tablet 2/19/2024 2/24/2024 Scott Daniels MD          Follow-up Information       Follow up With Specialties Details Why Contact Info    Azul Giang, FNP Family Medicine Schedule an appointment as soon as possible for a visit   [facility] [facility] [address]  [phone]      [facility]  Orthopedics Orthopedics Call   [address] [facility] [address] [phone]    [facility] - Emergency Dept Emergency Medicine Go to  If symptoms worsen [address]  [facility] [address] [phone]             Scott Daniels MD  02/19/24 0934

## 2024-02-24 ENCOUNTER — HOSPITAL ENCOUNTER (EMERGENCY)
Facility: HOSPITAL | Age: 59
Discharge: HOME | End: 2024-02-24
Attending: EMERGENCY MEDICINE
Payer: MEDICAID

## 2024-02-24 VITALS
OXYGEN SATURATION: 98 % | RESPIRATION RATE: 18 BRPM | HEIGHT: 65 IN | TEMPERATURE: 98 F | DIASTOLIC BLOOD PRESSURE: 72 MMHG | HEART RATE: 88 BPM | SYSTOLIC BLOOD PRESSURE: 123 MMHG | BODY MASS INDEX: 29.66 KG/M2 | WEIGHT: 178 LBS

## 2024-02-24 DIAGNOSIS — M25.562 ACUTE PAIN OF LEFT KNEE: ICD-10-CM

## 2024-02-24 DIAGNOSIS — M19.90 OSTEOARTHRITIS, UNSPECIFIED OSTEOARTHRITIS TYPE, UNSPECIFIED SITE: Primary | ICD-10-CM

## 2024-02-24 PROCEDURE — 25000003 PHARM REV CODE 250: Performed by: PHYSICIAN ASSISTANT

## 2024-02-24 PROCEDURE — 99284 EMERGENCY DEPT VISIT MOD MDM: CPT

## 2024-02-24 RX ORDER — TRAMADOL HYDROCHLORIDE 50 MG/1
50 TABLET ORAL EVERY 6 HOURS PRN
Qty: 12 TABLET | Refills: 0 | Status: SHIPPED | OUTPATIENT
Start: 2024-02-24

## 2024-02-24 RX ORDER — METHYLPREDNISOLONE 4 MG/1
TABLET ORAL
Qty: 21 EACH | Refills: 0 | Status: SHIPPED | OUTPATIENT
Start: 2024-02-24 | End: 2024-03-16

## 2024-02-24 RX ORDER — HYDROCODONE BITARTRATE AND ACETAMINOPHEN 5; 325 MG/1; MG/1
1 TABLET ORAL
Status: COMPLETED | OUTPATIENT
Start: 2024-02-24 | End: 2024-02-24

## 2024-02-24 RX ORDER — MELOXICAM 15 MG/1
15 TABLET ORAL DAILY
Qty: 30 TABLET | Refills: 0 | Status: SHIPPED | OUTPATIENT
Start: 2024-02-24 | End: 2024-03-25

## 2024-02-24 RX ADMIN — HYDROCODONE BITARTRATE AND ACETAMINOPHEN 1 TABLET: 5; 325 TABLET ORAL at 07:02

## 2024-02-25 NOTE — DISCHARGE INSTRUCTIONS
Use ice and elevation.  Take Mobic daily.  Use Medrol Dosepak.  Take tramadol as needed.  For severe pain.  Do not drink or drive while taking tramadol as can be sedating.  Follow-up as scheduled appointment on Wednesday

## 2024-02-25 NOTE — ED PROVIDER NOTES
Encounter Date: 2/24/2024       History     Chief Complaint   Patient presents with    Knee Pain     C/o left knee pain. Seen here and dx. With arthritis. Apt. Wednesday with ortho but states the pain is too bad and toradol is not helping.      58-year-old female presents to ED for evaluation of left knee pain.  Patient denies any trauma or injury.  States that she has arthritis for the past several years.  Seen here on Monday and given Toradol shot with p.o. Toradol without relief.  States she was not have a follow up appointment until Wednesday in his requesting pain relief until then.    The history is provided by the patient. No  was used.     Review of patient's allergies indicates:  No Known Allergies  Past Medical History:   Diagnosis Date    Arthritis      Past Surgical History:   Procedure Laterality Date    APPENDECTOMY      DILATION AND CURETTAGE OF UTERUS  10/03/2023     Family History   Problem Relation Age of Onset    Ovarian cancer Mother     Alzheimer's disease Father     Diabetes Father     Diabetes Brother      Social History     Tobacco Use    Smoking status: Never    Smokeless tobacco: Never   Substance Use Topics    Alcohol use: Never    Drug use: Never     Review of Systems   Constitutional:  Negative for chills, fatigue and fever.   Respiratory:  Negative for shortness of breath.    Cardiovascular:  Negative for chest pain.   Gastrointestinal:  Negative for abdominal pain, diarrhea, nausea and vomiting.   Genitourinary:  Negative for dysuria, flank pain, frequency, pelvic pain and urgency.   Musculoskeletal:  Positive for arthralgias and joint swelling. Negative for myalgias.   All other systems reviewed and are negative.      Physical Exam     Initial Vitals [02/24/24 1825]   BP Pulse Resp Temp SpO2   123/72 88 18 97.9 °F (36.6 °C) 98 %      MAP       --         Physical Exam    Nursing note and vitals reviewed.  Constitutional: She appears well-developed and  well-nourished.   HENT:   Head: Normocephalic and atraumatic.   Right Ear: Tympanic membrane and external ear normal.   Left Ear: Tympanic membrane and external ear normal.   Mouth/Throat: Uvula is midline, oropharynx is clear and moist and mucous membranes are normal. No trismus in the jaw. No uvula swelling. No oropharyngeal exudate, posterior oropharyngeal edema or posterior oropharyngeal erythema.   Eyes: Conjunctivae are normal. Pupils are equal, round, and reactive to light.   Neck: Neck supple.   Normal range of motion.  Cardiovascular:  Normal rate, regular rhythm and normal heart sounds.           Pulmonary/Chest: Breath sounds normal. She has no wheezes. She has no rhonchi. She has no rales.   Abdominal: Abdomen is soft. Bowel sounds are normal. There is no abdominal tenderness.   Musculoskeletal:         General: Normal range of motion.      Cervical back: Normal range of motion and neck supple.      Comments: Left knee: Crepitus present. No swelling, deformity, effusion, erythema, ecchymosis, lacerations or bony tenderness. Decreased range of motion. Tenderness present over the medial joint line and lateral joint line.      Neurological: She is alert and oriented to person, place, and time.   Skin: Skin is warm and dry.   Psychiatric: She has a normal mood and affect.         ED Course   Procedures  Labs Reviewed - No data to display       Imaging Results    None          Medications   HYDROcodone-acetaminophen 5-325 mg per tablet 1 tablet (has no administration in time range)     Medical Decision Making  58-year-old female presents to ED for evaluation of left knee pain.  Patient denies any trauma or injury.  States that she has arthritis for the past several years.  Seen here on Monday and given Toradol shot with p.o. Toradol without relief.  States she was not have a follow up appointment until Wednesday in his requesting pain relief until then.    Amount and/or Complexity of Data Reviewed  External  Data Reviewed: notes.     Details: Reviewed previous ED records.  Patient with history of osteoarthritis.  Patient had x-ray less than 1 week ago with negative findings.  Discussion of management or test interpretation with external provider(s): Patient afebrile and in no acute distress.  Presents to ED for evaluation of left knee pain due to osteoarthritis.  Patient GCS 15 and neuro intact.  States she was had no new trauma or injury.  No indication for repeat imaging.  X-ray performed less than 1 week ago negative.  Patient requesting pain management until she was able to see her doctor on Wednesday.  Patient currently on Toradol.  Will stop Toradol and placed her on a Medrol Dosepak and give meloxicam.  Will give short course of tramadol.  Return ED precautions given.  Patient verbalizes understanding.    Risk  OTC drugs.  Prescription drug management.  Parenteral controlled substances.                                      Clinical Impression:  Final diagnoses:  [M19.90] Osteoarthritis, unspecified osteoarthritis type, unspecified site (Primary)  [M25.562] Acute pain of left knee          ED Disposition Condition    Discharge Stable          ED Prescriptions       Medication Sig Dispense Start Date End Date Auth. Provider    meloxicam (MOBIC) 15 MG tablet Take 1 tablet (15 mg total) by mouth once daily. 30 tablet 2/24/2024 3/25/2024 Kristy Dinh PA    methylPREDNISolone (MEDROL DOSEPACK) 4 mg tablet use as directed 21 each 2/24/2024 3/16/2024 Kristy Dinh PA    traMADoL (ULTRAM) 50 mg tablet Take 1 tablet (50 mg total) by mouth every 6 (six) hours as needed for Pain. 12 tablet 2/24/2024 -- Kristy Dinh PA          Follow-up Information       Follow up With Specialties Details Why Contact Info    Azul Giang, FNP Family Medicine   [address] [facility] [address]  [phone]               Kristy Dinh PA  02/24/24 9772

## 2024-02-25 NOTE — ED TRIAGE NOTES
Knee Pain C/o left knee pain. Seen here and dx. With arthritis. Apt. Wednesday with ortho but states the pain is too bad and toradol is not helpin

## 2024-02-28 ENCOUNTER — HOSPITAL ENCOUNTER (OUTPATIENT)
Dept: RADIOLOGY | Facility: HOSPITAL | Age: 59
Discharge: HOME | End: 2024-02-28
Attending: STUDENT IN AN ORGANIZED HEALTH CARE EDUCATION/TRAINING PROGRAM
Payer: MEDICAID

## 2024-02-28 ENCOUNTER — OFFICE VISIT (OUTPATIENT)
Dept: ORTHOPEDICS | Facility: CLINIC | Age: 59
End: 2024-02-28
Payer: MEDICAID

## 2024-02-28 VITALS
WEIGHT: 178 LBS | BODY MASS INDEX: 29.66 KG/M2 | HEART RATE: 73 BPM | DIASTOLIC BLOOD PRESSURE: 86 MMHG | HEIGHT: 65 IN | SYSTOLIC BLOOD PRESSURE: 129 MMHG

## 2024-02-28 DIAGNOSIS — M25.562 PAIN IN BOTH KNEES, UNSPECIFIED CHRONICITY: ICD-10-CM

## 2024-02-28 DIAGNOSIS — M17.12 PRIMARY OSTEOARTHRITIS OF LEFT KNEE: Primary | ICD-10-CM

## 2024-02-28 DIAGNOSIS — M25.561 PAIN IN BOTH KNEES, UNSPECIFIED CHRONICITY: ICD-10-CM

## 2024-02-28 PROCEDURE — 73564 X-RAY EXAM KNEE 4 OR MORE: CPT | Mod: TC,RT

## 2024-02-28 PROCEDURE — 99214 OFFICE O/P EST MOD 30 MIN: CPT | Mod: PBBFAC,25

## 2024-02-28 PROCEDURE — 20610 DRAIN/INJ JOINT/BURSA W/O US: CPT | Mod: PBBFAC

## 2024-02-28 PROCEDURE — 73564 X-RAY EXAM KNEE 4 OR MORE: CPT | Mod: TC,LT

## 2024-02-28 RX ORDER — TRIAMCINOLONE ACETONIDE 40 MG/ML
40 INJECTION, SUSPENSION INTRA-ARTICULAR; INTRAMUSCULAR ONCE
Status: COMPLETED | OUTPATIENT
Start: 2024-02-28 | End: 2024-02-28

## 2024-02-28 RX ORDER — LIDOCAINE HYDROCHLORIDE 10 MG/ML
5 INJECTION, SOLUTION EPIDURAL; INFILTRATION; INTRACAUDAL; PERINEURAL
Status: DISPENSED | OUTPATIENT
Start: 2024-02-28

## 2024-02-28 RX ADMIN — TRIAMCINOLONE ACETONIDE 40 MG: 40 INJECTION, SUSPENSION INTRA-ARTICULAR; INTRAMUSCULAR at 02:02

## 2024-02-28 NOTE — PROGRESS NOTES
"  Subjective:    Patient ID: Winter Ortez is a 58 y.o. female  who presented to [facility] for new visit.      Chief Complaint: Pain of the Left Knee and Pain of the Right Knee      History of Present Illness:  Winter Ortez with history of OA for years presents with acute worsening left knee pain x 11 days. Denies any traumatic events. 5/10 sharp pain, worsens by end of the day. Improves temporarily with oral OTC meds and rest, worsens with activity. Notes popping, locking, crepitus, giving out. Treatment to date includes hot/cold, bracing, oral meds, voltarin gel. Did PT last year but did not finish sessions.     Knee Review of Systems:  Swelling?  no  Instability?  no  Mechanical sx?  yes  <30 min AM stiffness? yes  Limited ROM? yes  Fever/Chills? no       Objective:      Physical Exam:    /86   Pulse 73   Ht 5' 5" (1.651 m)   Wt 80.7 kg (178 lb)   BMI 29.62 kg/m²     Ortho/SPM Exam    Appearance:  Normal gait/station  FWB  Alignment: Left: normal Right: normal   Soft tissue swelling: Left: yes, mild Right: no  Effusion: Left:  Negative Right: Negative  Erythema: Left no Right: no  Ecchymosis: Left: no Right: no  Atrophy: Left: no Right: no    Palpation:  Knee Tenderness: Left: Pes Anserine bursa and nonspecific generalized tenderness Right: None    Range of motion:  Flexion (140): Left:  70 Right: 140  Extension (0): Left: 0 Right: 0    Strength:  Extension: Left 5/5  Pain: no     Right 5/5 Pain: no  Flexion: Left 5/5 Pain: no Right   5/5 Pain: no        Special Tests:  Ballotable Effusion:Left: Negative Right: Negative   Fluid Wave: Left: Negative Right: Negative   Crepitus: Left: Negative Right: Negative   Patellar grind test: Left: Not performed  Right: Not performed  Apprehension test: Left: Negative Right: Not performed   Varus: @ 0, Left Negative Right: Negative.  @ 30, Left Negative  Right Negative   Valgus: @ 0, Left Negative Right: " Negative.  @ 30, Left Negative  Right Negative  Lachman: Left: Negative Right: Negative   Ant Drawer: Left: Negative Right: Negative   Posterior Drawer: Left: Negative Right: Negative   Dial Test: Left: Not performed Right: Not performed   Aaron: Left: Not performed Right: Negative   Apley's: Left: Not performed Right: Not performed  Thessaly's: Left: Positive Right: Negative   Noble Compression: Left: Not performed Right: Not performed   Cathie: Left: Not performed Right: Not performed       General appearance: NAD  Peripheral pulses: normal bilaterally   Reflexes: Left: normal Right normal   Sensation: normal    Labs:  Last A1c: 5.4     Imaging:   Previous images reviewed.  X-rays ordered and performed today: yes  # of views: 4 Laterality: bilateral  My Interpretation:   Mild tricompartmental joint space narrowing with osteophytes, subchondral sclerosis, and subchondral cysts.      Assessment:        Encounter Diagnoses   Code Name Primary?    M25.561, M25.562 Pain in both knees, unspecified chronicity Yes    M17.12 Primary osteoarthritis of left knee         Plan:           Orders Placed This Encounter   Procedures    X-Ray Knee Complete 4 or More Views Left     Standing Status:   Future     Number of Occurrences:   1     Standing Expiration Date:   2/28/2025     Order Specific Question:   May the Radiologist modify the order per protocol to meet the clinical needs of the patient?     Answer:   Yes     Order Specific Question:   Release to patient     Answer:   Immediate    X-Ray Knee Complete 4 Or More Views Right     Standing Status:   Future     Number of Occurrences:   1     Standing Expiration Date:   2/28/2025     Order Specific Question:   May the Radiologist modify the order per protocol to meet the clinical needs of the patient?     Answer:   Yes     Order Specific Question:   Release to patient     Answer:   Immediate          MDM: Prior external referring provider notes reviewed. Prior external  referring provider studies reviewed.   Dx: left Knee Osteoarthritis.    Treatment Plan: Discussed with patient diagnosis, prognosis, and treatment recommendations. Education provided.    Home physical therapy exercise handouts provided to patient.   formal PT script provided to patient. You may take this script to whichever physical therapist you would like to go to.   topical hot or cold therapy  Over the counter NSAID and/or tylenol provided you do not have contraindications such as but not limited to liver or kidney disease or uncontrolled blood pressure. If you're doctors have told you to to not take them based on your health, do not take them.   oral glucosamine 1500 mg/day.  May consider VSI in the future if no improvements to pain.   Imaging: radiological studies ordered and independently reviewed; discussed with patient; pending radiologist interpretation.   Weight Management: is paramount. recommend at least 10% of total body weight loss if your bmi is 30-34.9. A bmi 24.9 or less may provide further relief..   Procedure: Discussed CSI/VSI as treatment options; discussed CSI vs VS injections as treatment options; since conservative measures did not improve symptoms patient consented for CSI today.  Activity: Activity as tolerated; HEP to include aerobic conditioning and strength training with non-painful activity. ROM/STG exercises. Proper footware; assistive devises to avoid limping.   Therapy: Physical Therapy  Medication: CONTINUE previously prescribed medication mobic . Please see your primary care physician for further refills.  RTC: follow up in 6 weeks.         Large Joint Aspiration/Injection: L knee    Date/Time: 2/28/2024 8:57 AM    Performed by: Javan Harrison MD  Authorized by: Javan Harrison MD    Consent Done?:  Yes (Written)  Indications:  Arthritis and pain  Site marked: the procedure site was marked    Timeout: prior to procedure the correct patient, procedure, and site was verified    Prep:  patient was prepped and draped in usual sterile fashion      Local anesthesia used?: Yes    Local anesthetic:  Topical anesthetic    Details:  Needle Size:  21 G  Approach:  Anterolateral  Location:  Knee  Site:  L knee  Patient tolerance:  Patient tolerated the procedure well with no immediate complications     Staff: Boris Rucker MD    Risks:  Possible complications with the injection include bleeding, infection (.01%), tendon rupture, steroid flare, fat pad or soft tissue atrophy, skin depigmentation, allergic reaction to medications and vasovagal response. (steroid flare treatment is rest, ice, NSAIDs and resolves in 24-36 hours.)    Consent:  No absolute contraindications (cellulitis overlying joint, infection, lack of informed consent, allergy to injection medication, AVN protein or egg allergy for sodium hyaluronate, or history of steroid flare) or relative contraindications (uncontrolled DM2 A1c>10, coagulopathy, INR > 3.5, previous joint replacement or history of AVN).        Description:  The patient was prepped in normal sterile fashion use of chlorhexidine scrub and the appropriate and anatomic landmarks were identified without ultrasound.  Contents of syringe included: 5cc of 1% of lidocaine with 40mg of Kenalog     Post Procedure: Patient alert, and moving all extremities. ROM improved, pain decreased.  Good peripheral pulses, no signs of vascular compromise and range of motion intact.  Aftercare instructions were given to patient at time of discharge.  Relative rest for 3 days-avoiding excess activity.  Place ice on the area for 15 minutes every 4-6 hours. Patient may take Tylenol a 1000 mg b.i.d. or ibuprofen 600 mg t.i.d. for the next 3-4 days if not on medication already and safe to take pending co-morbidities.  Protect the area for the next 1-8 hours if anesthetic was used.  Avoid excessive activity for the next 3-4 weeks.  ER precautions given for fever, severe joint pain or allergic reaction  or other new symptoms related to the joint injection.

## 2024-03-07 ENCOUNTER — TELEPHONE (OUTPATIENT)
Dept: INTERNAL MEDICINE | Facility: CLINIC | Age: 59
End: 2024-03-07
Payer: MEDICAID

## 2024-03-07 NOTE — TELEPHONE ENCOUNTER
----- Message from Soledad Moe sent at 3/6/2024  3:30 PM CST -----  Type:  Needs Medical Advice    Who Called: pt     Best Call Back Number: [phone]  Additional Information:pt would like to discuss weight loss medication with nurse , asked for a call back to discuss

## 2024-03-15 NOTE — PROGRESS NOTES
Faculty Attestation: Winter JENIFFER Ortez  was seen in [facility]. Discussed with resident at the time of the visit. History of Present Illness, Physical Exam, and Assessment and Plan reviewed. Treatment plan is reasonable and appropriate. Compliance with treatment recommendations is important.  Radiology images independently reviewed and agree with resident interpretation.  Procedure note reviewed. Present for entire procedure with the resident. Patient tolerated procedure well.      Boris Rucker MD  Sports Medicine

## 2024-05-13 RX ORDER — MELOXICAM 15 MG/1
TABLET ORAL
COMMUNITY
Start: 2024-02-25 | End: 2024-05-13 | Stop reason: SDUPTHER

## 2024-05-15 RX ORDER — MELOXICAM 15 MG/1
15 TABLET ORAL DAILY PRN
Qty: 30 TABLET | Refills: 3 | Status: SHIPPED | OUTPATIENT
Start: 2024-05-15 | End: 2024-05-24 | Stop reason: SDUPTHER

## 2024-05-16 ENCOUNTER — LAB VISIT (OUTPATIENT)
Dept: LAB | Facility: HOSPITAL | Age: 59
End: 2024-05-16
Attending: NURSE PRACTITIONER
Payer: MEDICAID

## 2024-05-16 DIAGNOSIS — R73.01 IMPAIRED FASTING GLUCOSE: ICD-10-CM

## 2024-05-16 DIAGNOSIS — E78.49 OTHER HYPERLIPIDEMIA: ICD-10-CM

## 2024-05-16 LAB
ALBUMIN SERPL-MCNC: 3.8 G/DL (ref 3.5–5)
ALBUMIN/GLOB SERPL: 1 RATIO (ref 1.1–2)
ALP SERPL-CCNC: 90 UNIT/L (ref 40–150)
ALT SERPL-CCNC: 10 UNIT/L (ref 0–55)
AST SERPL-CCNC: 15 UNIT/L (ref 5–34)
BASOPHILS # BLD AUTO: 0.03 X10(3)/MCL
BASOPHILS NFR BLD AUTO: 0.5 %
BILIRUB SERPL-MCNC: 0.4 MG/DL
BUN SERPL-MCNC: 17 MG/DL (ref 9.8–20.1)
CALCIUM SERPL-MCNC: 9.8 MG/DL (ref 8.4–10.2)
CHLORIDE SERPL-SCNC: 106 MMOL/L (ref 98–107)
CHOLEST SERPL-MCNC: 252 MG/DL
CHOLEST/HDLC SERPL: 4 {RATIO} (ref 0–5)
CO2 SERPL-SCNC: 24 MMOL/L (ref 22–29)
CREAT SERPL-MCNC: 0.76 MG/DL (ref 0.55–1.02)
EOSINOPHIL # BLD AUTO: 0.19 X10(3)/MCL (ref 0–0.9)
EOSINOPHIL NFR BLD AUTO: 3.5 %
ERYTHROCYTE [DISTWIDTH] IN BLOOD BY AUTOMATED COUNT: 12.8 % (ref 11.5–17)
EST. AVERAGE GLUCOSE BLD GHB EST-MCNC: 116.9 MG/DL
GFR SERPLBLD CREATININE-BSD FMLA CKD-EPI: >60 ML/MIN/1.73/M2
GLOBULIN SER-MCNC: 4 GM/DL (ref 2.4–3.5)
GLUCOSE SERPL-MCNC: 107 MG/DL (ref 74–100)
HBA1C MFR BLD: 5.7 %
HCT VFR BLD AUTO: 39.6 % (ref 37–47)
HDLC SERPL-MCNC: 63 MG/DL (ref 35–60)
HGB BLD-MCNC: 13.4 G/DL (ref 12–16)
IMM GRANULOCYTES # BLD AUTO: 0.01 X10(3)/MCL (ref 0–0.04)
IMM GRANULOCYTES NFR BLD AUTO: 0.2 %
LDLC SERPL CALC-MCNC: 170 MG/DL (ref 50–140)
LYMPHOCYTES # BLD AUTO: 1.88 X10(3)/MCL (ref 0.6–4.6)
LYMPHOCYTES NFR BLD AUTO: 34.4 %
MCH RBC QN AUTO: 29.5 PG (ref 27–31)
MCHC RBC AUTO-ENTMCNC: 33.8 G/DL (ref 33–36)
MCV RBC AUTO: 87 FL (ref 80–94)
MONOCYTES # BLD AUTO: 0.5 X10(3)/MCL (ref 0.1–1.3)
MONOCYTES NFR BLD AUTO: 9.2 %
NEUTROPHILS # BLD AUTO: 2.85 X10(3)/MCL (ref 2.1–9.2)
NEUTROPHILS NFR BLD AUTO: 52.2 %
NRBC BLD AUTO-RTO: 0 %
PLATELET # BLD AUTO: 315 X10(3)/MCL (ref 130–400)
PMV BLD AUTO: 9.8 FL (ref 7.4–10.4)
POTASSIUM SERPL-SCNC: 3.9 MMOL/L (ref 3.5–5.1)
PROT SERPL-MCNC: 7.8 GM/DL (ref 6.4–8.3)
RBC # BLD AUTO: 4.55 X10(6)/MCL (ref 4.2–5.4)
SODIUM SERPL-SCNC: 139 MMOL/L (ref 136–145)
TRIGL SERPL-MCNC: 95 MG/DL (ref 37–140)
VLDLC SERPL CALC-MCNC: 19 MG/DL
WBC # SPEC AUTO: 5.46 X10(3)/MCL (ref 4.5–11.5)

## 2024-05-16 PROCEDURE — 85025 COMPLETE CBC W/AUTO DIFF WBC: CPT

## 2024-05-16 PROCEDURE — 36415 COLL VENOUS BLD VENIPUNCTURE: CPT

## 2024-05-16 PROCEDURE — 83036 HEMOGLOBIN GLYCOSYLATED A1C: CPT

## 2024-05-16 PROCEDURE — 80053 COMPREHEN METABOLIC PANEL: CPT

## 2024-05-16 PROCEDURE — 80061 LIPID PANEL: CPT

## 2024-05-24 ENCOUNTER — OFFICE VISIT (OUTPATIENT)
Dept: INTERNAL MEDICINE | Facility: CLINIC | Age: 59
End: 2024-05-24
Payer: MEDICAID

## 2024-05-24 ENCOUNTER — TELEPHONE (OUTPATIENT)
Dept: INTERNAL MEDICINE | Facility: CLINIC | Age: 59
End: 2024-05-24

## 2024-05-24 DIAGNOSIS — M17.12 OSTEOARTHRITIS OF LEFT KNEE, UNSPECIFIED OSTEOARTHRITIS TYPE: Chronic | ICD-10-CM

## 2024-05-24 DIAGNOSIS — L30.9 DERMATITIS: ICD-10-CM

## 2024-05-24 DIAGNOSIS — E78.49 OTHER HYPERLIPIDEMIA: Primary | Chronic | ICD-10-CM

## 2024-05-24 PROCEDURE — 1159F MED LIST DOCD IN RCRD: CPT | Mod: CPTII,95,, | Performed by: NURSE PRACTITIONER

## 2024-05-24 PROCEDURE — 1160F RVW MEDS BY RX/DR IN RCRD: CPT | Mod: CPTII,95,, | Performed by: NURSE PRACTITIONER

## 2024-05-24 PROCEDURE — 3044F HG A1C LEVEL LT 7.0%: CPT | Mod: CPTII,95,, | Performed by: NURSE PRACTITIONER

## 2024-05-24 PROCEDURE — 99214 OFFICE O/P EST MOD 30 MIN: CPT | Mod: 95,,, | Performed by: NURSE PRACTITIONER

## 2024-05-24 RX ORDER — MELOXICAM 15 MG/1
15 TABLET ORAL DAILY PRN
Qty: 30 TABLET | Refills: 3 | Status: SHIPPED | OUTPATIENT
Start: 2024-05-24

## 2024-05-24 RX ORDER — TRIAMCINOLONE ACETONIDE 1 MG/G
CREAM TOPICAL 2 TIMES DAILY
Qty: 15 G | Refills: 1 | Status: SHIPPED | OUTPATIENT
Start: 2024-05-24

## 2024-05-24 RX ORDER — PRAVASTATIN SODIUM 10 MG/1
10 TABLET ORAL DAILY
Qty: 90 TABLET | Refills: 1 | Status: SHIPPED | OUTPATIENT
Start: 2024-05-24

## 2024-05-24 NOTE — TELEPHONE ENCOUNTER
Called pt to schedule 4 month F/U (around 9/24/2024), pt did not answer so I left a vm to contact clinic to schedule appt. LD

## 2024-05-24 NOTE — PROGRESS NOTES
Patient ID: [id number]     Chief Complaint: Follow-up and Results (States Trazone helping. )    HPI:     The patient location is: home  The chief complaint leading to consultation is: follow up    Visit type: audiovisual    Face to Face time with patient: 12 minutes  20 minutes of total time spent on the encounter, which includes face to face time and non-face to face time preparing to see the patient (eg, review of tests), Obtaining and/or reviewing separately obtained history, Documenting clinical information in the electronic or other health record, Independently interpreting results (not separately reported) and communicating results to the patient/family/caregiver, or Care coordination (not separately reported).     Each patient to whom he or she provides medical services by telemedicine is:  (1) informed of the relationship between the physician and patient and the respective role of any other health care provider with respect to management of the patient; and (2) notified that he or she may decline to receive medical services by telemedicine and may withdraw from such care at any time.      Winter Ortez is a 58 y.o. female with diagnosis of HLD. Patient seen today for follow up. Patient last seen on 01/17/2024.   At previous appt, patient started on Trazodone for insomnia. Patient states medication working well.   Patient also requested discontinuation of cholesterol medication. Patient was prescribed Pravastatin 10 mg daily. Cholesterol levels worsened. Patient states she restarted cholesterol medication yesterday.   Today, patient states rash to hand. Patient previously treated with Hydrocortisone topical cream with no improvement. Patient states rash itches.   Patient followed by Orthopedic Clinic for knee pain/OA. Patient states prescribed Meloxicam and requesting refill.   Patient denies any other acute complaints.      Patient followed by Orthopedic Clinic. Last appointment on 02/28/2024. Dx: left  Knee Osteoarthritis. Treatment Plan: Discussed with patient diagnosis, prognosis, and treatment recommendations. Education provided. Home physical therapy exercise handouts provided to patient. formal PT script provided to patient. You may take this script to whichever physical therapist you would like to go to. topical hot or cold therapy. Over the counter NSAID and/or tylenol provided you do not have contraindications such as but not limited to liver or kidney disease or uncontrolled blood pressure. If you're doctors have told you to to not take them based on your health, do not take them. oral glucosamine 1500 mg/day. May consider VSI in the future if no improvements to pain. Imaging: radiological studies ordered and independently reviewed; discussed with patient; pending radiologist interpretation. Weight Management: is paramount. recommend at least 10% of total body weight loss if your bmi is 30-34.9. A bmi 24.9 or less may provide further relief. Procedure: Discussed CSI/VSI as treatment options; discussed CSI vs VS injections as treatment options; since conservative measures did not improve symptoms patient consented for CSI today. Activity: Activity as tolerated; HEP to include aerobic conditioning and strength training with non-painful activity. ROM/STG exercises. Proper footware; assistive devises to avoid limping. Therapy: Physical Therapy. Medication: CONTINUE previously prescribed medication mobic . Please see your primary care physician for further refills. RTC: follow up in 6 weeks    Review of patient's allergies indicates:  No Known Allergies    Breast Cancer Screenin per Dr. Berman (GYN, will try to obtain  Cervical Cancer Screenin2021  Colorectal Cancer Screenin2017 per Dr. David Epps, will try to obtain  Diabetic Eye Exam: N/A  Diabetic Foot Exam: N/A  Lung Cancer Screening: N/A  Prostate Cancer Screening: N/A  AAA Screening: N/A  Osteoporosis Screening: deferred due to  age  Medicare Wellness: N/A  Immunizations:   Immunization History   Administered Date(s) Administered    COVID-19, MRNA, LN-S, PF (Pfizer) (Purple Cap) 03/12/2021, 04/06/2021    Tdap 10/31/2019     Past Surgical History:   Procedure Laterality Date    APPENDECTOMY      DILATION AND CURETTAGE OF UTERUS  10/03/2023     family history includes Alzheimer's disease in her father; Diabetes in her brother and father; Ovarian cancer in her mother.    Social History     Socioeconomic History    Marital status:     Number of children: 3   Tobacco Use    Smoking status: Never    Smokeless tobacco: Never   Substance and Sexual Activity    Alcohol use: Never    Drug use: Never    Sexual activity: Yes     Partners: Male     Social Determinants of Health     Financial Resource Strain: Low Risk  (5/24/2024)    Overall Financial Resource Strain (CARDIA)     Difficulty of Paying Living Expenses: Not hard at all   Food Insecurity: No Food Insecurity (5/24/2024)    Hunger Vital Sign     Worried About Running Out of Food in the Last Year: Never true     Ran Out of Food in the Last Year: Never true   Transportation Needs: No Transportation Needs (1/17/2024)    PRAPARE - Transportation     Lack of Transportation (Medical): No     Lack of Transportation (Non-Medical): No   Physical Activity: Insufficiently Active (5/24/2024)    Exercise Vital Sign     Days of Exercise per Week: 2 days     Minutes of Exercise per Session: 20 min   Stress: Stress Concern Present (5/24/2024)    [employer] [place] [employer] - Occupational Stress Questionnaire     Feeling of Stress : To some extent   Housing Stability: Low Risk  (1/17/2024)    Housing Stability Vital Sign     Unable to Pay for Housing in the Last Year: No     Number of Places Lived in the Last Year: 1     Unstable Housing in the Last Year: No     Current Outpatient Medications   Medication Instructions    aspirin (ECOTRIN) 81 mg, Daily    biotin 1 mg Cap 1 capsule, Oral,  Daily    hydrocortisone 2.5 % cream Topical (Top)    meloxicam (MOBIC) 15 mg, Oral, Daily PRN    pravastatin (PRAVACHOL) 10 mg, Oral, Daily    traMADoL (ULTRAM) 50 mg, Oral, Every 6 hours PRN    traZODone (DESYREL) 50 mg, Oral, Nightly PRN    triamcinolone acetonide 0.1% (KENALOG) 0.1 % cream Topical (Top), 2 times daily    vitamin D (VITAMIN D3) 5,000 Units, Oral, Daily       Subjective:     Review of Systems   Constitutional: Negative.    HENT: Negative.     Eyes: Negative.    Respiratory: Negative.     Cardiovascular: Negative.    Gastrointestinal: Negative.    Endocrine: Negative.    Genitourinary: Negative.    Musculoskeletal:  Positive for arthralgias.   Skin:  Positive for rash.   Allergic/Immunologic: Negative.    Neurological: Negative.    Hematological: Negative.    Psychiatric/Behavioral: Negative.         Objective:     There were no vitals taken for this visit.      Physical Exam  Skin:     Findings: Rash present.   Neurological:      Mental Status: She is alert and oriented to person, place, and time.   Psychiatric:         Mood and Affect: Mood normal.       Labs Reviewed:     Hematology:  Lab Results   Component Value Date    WBC 5.46 05/16/2024    HGB 13.4 05/16/2024    HCT 39.6 05/16/2024     05/16/2024     Chemistry:  Lab Results   Component Value Date     05/16/2024    K 3.9 05/16/2024    CHLORIDE 106 05/16/2024    BUN 17.0 05/16/2024    CREATININE 0.76 05/16/2024    EGFRNORACEVR >60 05/16/2024    GLUCOSE 107 (H) 05/16/2024    CALCIUM 9.8 05/16/2024    ALKPHOS 90 05/16/2024    LABPROT 7.8 05/16/2024    ALBUMIN 3.8 05/16/2024    BILIDIR 0.2 03/25/2022    IBILI 0.30 03/25/2022    AST 15 05/16/2024    ALT 10 05/16/2024    LUOGZLPB08MX 56.1 07/31/2023     Lab Results   Component Value Date    HGBA1C 5.7 05/16/2024     Lipid Panel:  Lab Results   Component Value Date    CHOL 252 (H) 05/16/2024    HDL 63 (H) 05/16/2024    .00 (H) 05/16/2024    TRIG 95 05/16/2024    TOTALCHOLEST 4  05/16/2024     Thyroid:  Lab Results   Component Value Date    TSH 2.017 07/31/2023     Urine:  Lab Results   Component Value Date    COLORUA Light-Yellow 12/04/2023    APPEARANCEUA Clear 12/04/2023    SGUA 1.022 12/04/2023    PHUA 6.0 12/04/2023    PROTEINUA Negative 12/04/2023    GLUCOSEUA Normal 12/04/2023    KETONESUA Negative 12/04/2023    BLOODUA Negative 12/04/2023    NITRITESUA Negative 12/04/2023    LEUKOCYTESUR Negative 12/04/2023    RBCUA 0-5 12/04/2023    WBCUA 0-5 12/04/2023    BACTERIA None Seen 12/04/2023    SQEPUA Trace (A) 12/04/2023    HYALINECASTS None Seen 12/04/2023        Assessment:       ICD-10-CM ICD-9-CM   1. Other hyperlipidemia  E78.49 272.4   2. Osteoarthritis of left knee, unspecified osteoarthritis type  M17.12 715.96   3. Dermatitis  L30.9 692.9       Plan:     1. Other hyperlipidemia  Restart Pravastatin 10 mg daily  Weight loss encouraged  Low fat/high fiber diet  Increase physical activity  Cholesterol Total   Date Value Ref Range Status   05/16/2024 252 (H) <=200 mg/dL Final     HDL Cholesterol   Date Value Ref Range Status   05/16/2024 63 (H) 35 - 60 mg/dL Final     Triglyceride   Date Value Ref Range Status   05/16/2024 95 37 - 140 mg/dL Final     LDL Cholesterol   Date Value Ref Range Status   05/16/2024 170.00 (H) 50.00 - 140.00 mg/dL Final     2. Osteoarthritis of left knee, unspecified osteoarthritis type  Continue Meloxicam  Followed by Orthopedic    3. Dermatitis  Stop Hydrocortisone cream due to no improvement  Start Triamcinolone 0.1% cream bid  Referred to dermatology  - Ambulatory referral/consult to Dermatology; Future      Follow up in about 4 months (around 9/24/2024) for Labs. In addition to their scheduled follow up, the patient has also been instructed to follow up on as needed basis.     PAOLO Hsu

## 2024-05-30 ENCOUNTER — DOCUMENTATION ONLY (OUTPATIENT)
Dept: INTERNAL MEDICINE | Facility: CLINIC | Age: 59
End: 2024-05-30
Payer: MEDICAID

## 2024-10-08 ENCOUNTER — TELEPHONE (OUTPATIENT)
Dept: INTERNAL MEDICINE | Facility: CLINIC | Age: 59
End: 2024-10-08

## 2024-10-28 ENCOUNTER — OFFICE VISIT (OUTPATIENT)
Dept: FAMILY MEDICINE | Facility: CLINIC | Age: 59
End: 2024-10-28
Payer: MEDICAID

## 2024-10-28 DIAGNOSIS — M79.671 RIGHT FOOT PAIN: ICD-10-CM

## 2024-10-28 DIAGNOSIS — R73.03 PREDIABETES: ICD-10-CM

## 2024-10-28 DIAGNOSIS — E78.49 OTHER HYPERLIPIDEMIA: Chronic | ICD-10-CM

## 2024-10-28 DIAGNOSIS — R03.0 ELEVATED BLOOD PRESSURE READING WITHOUT DIAGNOSIS OF HYPERTENSION: ICD-10-CM

## 2024-10-28 DIAGNOSIS — Z00.00 ANNUAL PHYSICAL EXAM: Primary | ICD-10-CM

## 2024-10-28 PROBLEM — Z12.31 ENCOUNTER FOR SCREENING MAMMOGRAM FOR BREAST CANCER: Status: ACTIVE | Noted: 2024-10-28

## 2024-10-28 LAB
ALBUMIN SERPL-MCNC: 4.1 G/DL (ref 3.5–5)
ALBUMIN/GLOB SERPL: 1 RATIO (ref 1.1–2)
ALP SERPL-CCNC: 106 UNIT/L (ref 40–150)
ALT SERPL-CCNC: 11 UNIT/L (ref 0–55)
ANION GAP SERPL CALC-SCNC: 11 MEQ/L
AST SERPL-CCNC: 18 UNIT/L (ref 5–34)
BACTERIA #/AREA URNS AUTO: ABNORMAL /HPF
BASOPHILS # BLD AUTO: 0.04 X10(3)/MCL
BASOPHILS NFR BLD AUTO: 0.5 %
BILIRUB SERPL-MCNC: 0.4 MG/DL
BILIRUB UR QL STRIP.AUTO: NEGATIVE
BUN SERPL-MCNC: 11.6 MG/DL (ref 9.8–20.1)
CALCIUM SERPL-MCNC: 9.9 MG/DL (ref 8.4–10.2)
CHLORIDE SERPL-SCNC: 104 MMOL/L (ref 98–107)
CHOLEST SERPL-MCNC: 207 MG/DL
CHOLEST/HDLC SERPL: 3 {RATIO} (ref 0–5)
CLARITY UR: CLEAR
CO2 SERPL-SCNC: 27 MMOL/L (ref 22–29)
COLOR UR AUTO: COLORLESS
CREAT SERPL-MCNC: 0.66 MG/DL (ref 0.55–1.02)
CREAT/UREA NIT SERPL: 18
EOSINOPHIL # BLD AUTO: 0.21 X10(3)/MCL (ref 0–0.9)
EOSINOPHIL NFR BLD AUTO: 2.6 %
ERYTHROCYTE [DISTWIDTH] IN BLOOD BY AUTOMATED COUNT: 12.7 % (ref 11.5–17)
EST. AVERAGE GLUCOSE BLD GHB EST-MCNC: 114 MG/DL
GFR SERPLBLD CREATININE-BSD FMLA CKD-EPI: >60 ML/MIN/1.73/M2
GLOBULIN SER-MCNC: 4.1 GM/DL (ref 2.4–3.5)
GLUCOSE SERPL-MCNC: 84 MG/DL (ref 74–100)
GLUCOSE UR QL STRIP: NEGATIVE
HBA1C MFR BLD: 5.6 %
HCT VFR BLD AUTO: 39.8 % (ref 37–47)
HDLC SERPL-MCNC: 65 MG/DL (ref 35–60)
HGB BLD-MCNC: 13.2 G/DL (ref 12–16)
HGB UR QL STRIP: NEGATIVE
HYALINE CASTS #/AREA URNS LPF: ABNORMAL /LPF
IMM GRANULOCYTES # BLD AUTO: 0.02 X10(3)/MCL (ref 0–0.04)
IMM GRANULOCYTES NFR BLD AUTO: 0.2 %
KETONES UR QL STRIP: NEGATIVE
LDLC SERPL CALC-MCNC: 118 MG/DL (ref 50–140)
LEUKOCYTE ESTERASE UR QL STRIP: NEGATIVE
LYMPHOCYTES # BLD AUTO: 1.97 X10(3)/MCL (ref 0.6–4.6)
LYMPHOCYTES NFR BLD AUTO: 23.9 %
MCH RBC QN AUTO: 29 PG (ref 27–31)
MCHC RBC AUTO-ENTMCNC: 33.2 G/DL (ref 33–36)
MCV RBC AUTO: 87.5 FL (ref 80–94)
MONOCYTES # BLD AUTO: 0.58 X10(3)/MCL (ref 0.1–1.3)
MONOCYTES NFR BLD AUTO: 7 %
NEUTROPHILS # BLD AUTO: 5.41 X10(3)/MCL (ref 2.1–9.2)
NEUTROPHILS NFR BLD AUTO: 65.8 %
NITRITE UR QL STRIP: NEGATIVE
NRBC BLD AUTO-RTO: 0 %
PH UR STRIP: 5.5 [PH]
PLATELET # BLD AUTO: 317 X10(3)/MCL (ref 130–400)
PMV BLD AUTO: 10.7 FL (ref 7.4–10.4)
POTASSIUM SERPL-SCNC: 3.8 MMOL/L (ref 3.5–5.1)
PROT SERPL-MCNC: 8.2 GM/DL (ref 6.4–8.3)
PROT UR QL STRIP: NEGATIVE
RBC # BLD AUTO: 4.55 X10(6)/MCL (ref 4.2–5.4)
RBC #/AREA URNS AUTO: ABNORMAL /HPF
SODIUM SERPL-SCNC: 142 MMOL/L (ref 136–145)
SP GR UR STRIP.AUTO: 1.01 (ref 1–1.03)
SQUAMOUS #/AREA URNS LPF: ABNORMAL /HPF
TRIGL SERPL-MCNC: 118 MG/DL (ref 37–140)
TSH SERPL-ACNC: 1.56 UIU/ML (ref 0.35–4.94)
UROBILINOGEN UR STRIP-ACNC: NORMAL
VLDLC SERPL CALC-MCNC: 24 MG/DL
WBC # BLD AUTO: 8.23 X10(3)/MCL (ref 4.5–11.5)
WBC #/AREA URNS AUTO: ABNORMAL /HPF

## 2024-10-28 PROCEDURE — 80061 LIPID PANEL: CPT | Performed by: NURSE PRACTITIONER

## 2024-10-28 PROCEDURE — 3008F BODY MASS INDEX DOCD: CPT | Mod: CPTII,,, | Performed by: NURSE PRACTITIONER

## 2024-10-28 PROCEDURE — 99396 PREV VISIT EST AGE 40-64: CPT | Mod: S$PBB,,, | Performed by: NURSE PRACTITIONER

## 2024-10-28 PROCEDURE — 99214 OFFICE O/P EST MOD 30 MIN: CPT | Mod: S$PBB,25,, | Performed by: NURSE PRACTITIONER

## 2024-10-28 PROCEDURE — 83036 HEMOGLOBIN GLYCOSYLATED A1C: CPT | Performed by: NURSE PRACTITIONER

## 2024-10-28 PROCEDURE — 3044F HG A1C LEVEL LT 7.0%: CPT | Mod: CPTII,,, | Performed by: NURSE PRACTITIONER

## 2024-10-28 PROCEDURE — 3074F SYST BP LT 130 MM HG: CPT | Mod: CPTII,,, | Performed by: NURSE PRACTITIONER

## 2024-10-28 PROCEDURE — 80053 COMPREHEN METABOLIC PANEL: CPT | Performed by: NURSE PRACTITIONER

## 2024-10-28 PROCEDURE — 3078F DIAST BP <80 MM HG: CPT | Mod: CPTII,,, | Performed by: NURSE PRACTITIONER

## 2024-10-28 PROCEDURE — 99215 OFFICE O/P EST HI 40 MIN: CPT | Mod: PBBFAC | Performed by: NURSE PRACTITIONER

## 2024-10-28 PROCEDURE — 81001 URINALYSIS AUTO W/SCOPE: CPT | Performed by: NURSE PRACTITIONER

## 2024-10-28 PROCEDURE — 1160F RVW MEDS BY RX/DR IN RCRD: CPT | Mod: CPTII,,, | Performed by: NURSE PRACTITIONER

## 2024-10-28 PROCEDURE — 1159F MED LIST DOCD IN RCRD: CPT | Mod: CPTII,,, | Performed by: NURSE PRACTITIONER

## 2024-10-28 PROCEDURE — 84443 ASSAY THYROID STIM HORMONE: CPT | Performed by: NURSE PRACTITIONER

## 2024-10-28 PROCEDURE — 85025 COMPLETE CBC W/AUTO DIFF WBC: CPT | Performed by: NURSE PRACTITIONER

## 2024-10-29 ENCOUNTER — HOSPITAL ENCOUNTER (OUTPATIENT)
Dept: RADIOLOGY | Facility: HOSPITAL | Age: 59
Discharge: HOME | End: 2024-10-29
Attending: NURSE PRACTITIONER
Payer: MEDICAID

## 2024-10-29 DIAGNOSIS — M79.671 RIGHT FOOT PAIN: ICD-10-CM

## 2024-10-29 PROCEDURE — 73630 X-RAY EXAM OF FOOT: CPT | Mod: TC,RT

## 2024-10-30 ENCOUNTER — TELEPHONE (OUTPATIENT)
Dept: FAMILY MEDICINE | Facility: CLINIC | Age: 59
End: 2024-10-30
Payer: MEDICAID

## 2024-10-30 VITALS
SYSTOLIC BLOOD PRESSURE: 120 MMHG | BODY MASS INDEX: 29.16 KG/M2 | RESPIRATION RATE: 18 BRPM | TEMPERATURE: 98 F | DIASTOLIC BLOOD PRESSURE: 75 MMHG | HEIGHT: 65 IN | WEIGHT: 175 LBS | HEART RATE: 77 BPM

## 2024-10-30 DIAGNOSIS — Z00.00 ANNUAL PHYSICAL EXAM: Primary | ICD-10-CM

## 2024-10-30 RX ORDER — PRAVASTATIN SODIUM 10 MG/1
10 TABLET ORAL DAILY
Qty: 90 TABLET | Refills: 3 | Status: SHIPPED | OUTPATIENT
Start: 2024-10-30

## 2024-10-31 ENCOUNTER — TELEPHONE (OUTPATIENT)
Dept: FAMILY MEDICINE | Facility: CLINIC | Age: 59
End: 2024-10-31
Payer: MEDICAID

## 2024-12-15 ENCOUNTER — HOSPITAL ENCOUNTER (EMERGENCY)
Facility: HOSPITAL | Age: 59
Discharge: HOME | End: 2024-12-15
Attending: EMERGENCY MEDICINE
Payer: MEDICAID

## 2024-12-15 VITALS
DIASTOLIC BLOOD PRESSURE: 81 MMHG | BODY MASS INDEX: 29.66 KG/M2 | TEMPERATURE: 98 F | OXYGEN SATURATION: 96 % | RESPIRATION RATE: 20 BRPM | HEIGHT: 65 IN | SYSTOLIC BLOOD PRESSURE: 138 MMHG | WEIGHT: 178 LBS | HEART RATE: 78 BPM

## 2024-12-15 DIAGNOSIS — G89.29 CHRONIC PAIN OF RIGHT KNEE: Primary | ICD-10-CM

## 2024-12-15 DIAGNOSIS — M25.561 CHRONIC PAIN OF RIGHT KNEE: Primary | ICD-10-CM

## 2024-12-15 DIAGNOSIS — M17.11 OSTEOARTHRITIS OF RIGHT KNEE, UNSPECIFIED OSTEOARTHRITIS TYPE: ICD-10-CM

## 2024-12-15 PROCEDURE — 96372 THER/PROPH/DIAG INJ SC/IM: CPT

## 2024-12-15 PROCEDURE — 99284 EMERGENCY DEPT VISIT MOD MDM: CPT | Mod: 25

## 2024-12-15 PROCEDURE — 96372 THER/PROPH/DIAG INJ SC/IM: CPT | Performed by: EMERGENCY MEDICINE

## 2024-12-15 PROCEDURE — 63600175 PHARM REV CODE 636 W HCPCS: Performed by: EMERGENCY MEDICINE

## 2024-12-15 RX ORDER — METHYLPREDNISOLONE 4 MG/1
TABLET ORAL
Qty: 21 EACH | Refills: 0 | Status: SHIPPED | OUTPATIENT
Start: 2024-12-15 | End: 2025-01-05

## 2024-12-15 RX ORDER — TRAMADOL HYDROCHLORIDE 50 MG/1
50 TABLET ORAL EVERY 8 HOURS PRN
Qty: 12 TABLET | Refills: 0 | Status: SHIPPED | OUTPATIENT
Start: 2024-12-15

## 2024-12-15 RX ORDER — KETOROLAC TROMETHAMINE 30 MG/ML
15 INJECTION, SOLUTION INTRAMUSCULAR; INTRAVENOUS
Status: COMPLETED | OUTPATIENT
Start: 2024-12-15 | End: 2024-12-15

## 2024-12-15 RX ADMIN — KETOROLAC TROMETHAMINE 15 MG: 30 INJECTION, SOLUTION INTRAMUSCULAR at 08:12

## 2024-12-15 NOTE — ED PROVIDER NOTES
Encounter Date: 12/15/2024       History     Chief Complaint   Patient presents with    Knee Pain     Sharp shooting Right knee pain. Onset x 3 weeks with progressive worsening over the last 3 weeks. Pt took meloxicam and ibuprophen last night with minimal relief. Pt states history of arthritis in right knee.     Patient is a 59-year-old female presenting with right knee pain.  She denies anyrecent trauma.  This has been a chronic issue.  She was last seen by an orthopedist approximately 10 months ago and was prescribed physical therapy which she completed.  She has been doing well until these last 3 weeks when it started to cause her issues again.  She gets intermittent swelling to the right knee without erythema.  She has some mild sensation changes that radiate down to her toes; this was present previously with her osteoarthritis flares as well.  She denies any fevers, chills, chest pain, shortness of breath, back pain, bowel or bladder incontinence, or weakness of the leg.        Review of patient's allergies indicates:  No Known Allergies  Past Medical History:   Diagnosis Date    Arthritis      Past Surgical History:   Procedure Laterality Date    APPENDECTOMY      DILATION AND CURETTAGE OF UTERUS  10/03/2023     Family History   Problem Relation Name Age of Onset    Ovarian cancer Mother Richardson     Cancer Mother Richardson     Alzheimer's disease Father Kashif     Diabetes Father Kashif     [facility]      Social History     Tobacco Use    Smoking status: Never    Smokeless tobacco: Never   Substance Use Topics    Alcohol use: Never    Drug use: Never     Review of Systems   Constitutional:  Negative for fever.   HENT:  Negative for sore throat.    Respiratory:  Negative for shortness of breath.    Cardiovascular:  Negative for chest pain.   Gastrointestinal:  Negative for nausea.   Genitourinary:  Negative for dysuria.   Musculoskeletal:  Positive for joint swelling. Negative for back pain.    Skin:  Negative for rash.   Neurological:  Negative for weakness.   Hematological:  Does not bruise/bleed easily.       Physical Exam     Initial Vitals [12/15/24 0739]   BP Pulse Resp Temp SpO2   138/81 78 20 98.3 °F (36.8 °C) 96 %      MAP       --         Physical Exam    Nursing note and vitals reviewed.  Constitutional: She appears well-developed and well-nourished. No distress.   HENT:   Head: Normocephalic and atraumatic.   Neck: Neck supple.   Cardiovascular:  Normal rate, regular rhythm and normal heart sounds.           No murmur heard.  Pulmonary/Chest: Breath sounds normal. No respiratory distress. She has no wheezes. She has no rhonchi.   Musculoskeletal:      Cervical back: Neck supple.      Comments: Right lower extremity is neurovascularly intact.  Peripheral pulses are +2.  She is able to hold leg out an active extension.  There is no obvious deformity or overlying abnormality such as erythema swelling or rash.  She is diffusely tender to the knee but is best localized to the anteromedial aspect.     Neurological: She is alert and oriented to person, place, and time.   Skin: Skin is warm and dry.   Psychiatric: She has a normal mood and affect. Thought content normal.         ED Course   Procedures  Labs Reviewed - No data to display       Imaging Results    None          Medications   ketorolac injection 15 mg (15 mg Intramuscular Given 12/15/24 0814)     Medical Decision Making  Patient is a 59-year-old female presenting with right knee pain that is similar to previous flare-ups of her osteoarthritis.  No different complaints at this time.  No falls or trauma.  On evaluation there is no evidence of emergent conditions such as DVT, septic arthritis, or otherwise.  We will treat similar to previous as she had significant improvement with steroids and Ultram.  I did recommend following back up with the orthopedist who originally saw her as she has done physical therapy and she may be a candidate  for either intra-articular injections or knee replacement.  I do not think she needs repeat imaging at this time. Questions were answered along with a discussion of signs and symptoms to return for. Patient comfortable with plan of discharge.      Problems Addressed:  Chronic pain of right knee: chronic illness or injury  Osteoarthritis of right knee, unspecified osteoarthritis type: chronic illness or injury    Risk  Prescription drug management.                                      Clinical Impression:  Final diagnoses:  [M25.561, G89.29] Chronic pain of right knee (Primary)  [M17.11] Osteoarthritis of right knee, unspecified osteoarthritis type          ED Disposition Condition    Discharge Stable          ED Prescriptions       Medication Sig Dispense Start Date End Date Auth. Provider    methylPREDNISolone (MEDROL DOSEPACK) 4 mg tablet use as directed 21 each 12/15/2024 1/5/2025 Samantha Samuel MD    traMADoL (ULTRAM) 50 mg tablet Take 1 tablet (50 mg total) by mouth every 8 (eight) hours as needed for Pain. 12 tablet 12/15/2024 -- Samantha Samuel MD          Follow-up Information       Follow up With Specialties Details Why Contact Info    Boris Rucker MD Sports Medicine Schedule an appointment as soon as possible for a visit   [address] [facility] [address] [phone]               Samantha Samuel MD  12/16/24 2840

## 2024-12-15 NOTE — Clinical Note
"Winter Zazuetar" Pérez was seen and treated in our emergency department on 12/15/2024.  She may return to work on 12/17/2024.       If you have any questions or concerns, please don't hesitate to call.      Samantha Samuel MD"

## 2024-12-27 ENCOUNTER — OFFICE VISIT (OUTPATIENT)
Dept: FAMILY MEDICINE | Facility: CLINIC | Age: 59
End: 2024-12-27
Payer: MEDICAID

## 2024-12-27 VITALS
HEART RATE: 86 BPM | BODY MASS INDEX: 29.16 KG/M2 | HEIGHT: 65 IN | DIASTOLIC BLOOD PRESSURE: 71 MMHG | WEIGHT: 175 LBS | RESPIRATION RATE: 20 BRPM | SYSTOLIC BLOOD PRESSURE: 106 MMHG | TEMPERATURE: 98 F | OXYGEN SATURATION: 100 %

## 2024-12-27 DIAGNOSIS — L30.9 DERMATITIS: ICD-10-CM

## 2024-12-27 PROCEDURE — 99214 OFFICE O/P EST MOD 30 MIN: CPT | Mod: PBBFAC | Performed by: FAMILY MEDICINE

## 2024-12-27 RX ORDER — TACROLIMUS 1 MG/G
OINTMENT TOPICAL 2 TIMES DAILY
Qty: 30 G | Refills: 1 | Status: SHIPPED | OUTPATIENT
Start: 2024-12-27

## 2024-12-27 NOTE — PROGRESS NOTES
Subjective:       Patient ID: Winter Ortez is a 59 y.o. female.    Chief Complaint: Rash (Bilateral hands)    HPI  58 yo female presents to derm screening clinic because of rash on right hand. Has been present intermittently x 1 year. Not active today.   Uses cetaphil with improvement. Found that steroid creams have not helped much in past. Now uses cotton cloves. Does not think it is related to chemical exposures. Denied other lesions. Family member has history of eczema.     Review of Systems  Denied  Objective:      Physical Exam    Vitals:    12/27/24 1015   BP: 106/71   Pulse: 86   Resp: 20   Temp: 98.1 °F (36.7 °C)   NAD, AXO  Integumentary: dry, healed skin on palmar surface of right hand, no active lesions    Assessment:       1. Dermatitis        Plan:     No active lesion today  Eczema variant or palmar psoriasis from previous pictures?  Continue using cetaphil  Will do a trial of tacrolimus ointment  Patient to contact clinic when she has active flare to be seen in derm clinic

## 2024-12-31 ENCOUNTER — OFFICE VISIT (OUTPATIENT)
Dept: ORTHOPEDICS | Facility: CLINIC | Age: 59
End: 2024-12-31
Payer: MEDICAID

## 2024-12-31 VITALS
HEIGHT: 65 IN | TEMPERATURE: 97 F | OXYGEN SATURATION: 99 % | WEIGHT: 173 LBS | BODY MASS INDEX: 28.82 KG/M2 | DIASTOLIC BLOOD PRESSURE: 76 MMHG | RESPIRATION RATE: 18 BRPM | HEART RATE: 88 BPM | SYSTOLIC BLOOD PRESSURE: 126 MMHG

## 2024-12-31 DIAGNOSIS — M23.91 INTERNAL DERANGEMENT OF RIGHT KNEE: Primary | ICD-10-CM

## 2024-12-31 DIAGNOSIS — M17.11 PRIMARY OSTEOARTHRITIS OF RIGHT KNEE: ICD-10-CM

## 2024-12-31 PROCEDURE — 99214 OFFICE O/P EST MOD 30 MIN: CPT | Mod: PBBFAC,25

## 2024-12-31 PROCEDURE — 20610 DRAIN/INJ JOINT/BURSA W/O US: CPT | Mod: PBBFAC

## 2024-12-31 PROCEDURE — 20610 DRAIN/INJ JOINT/BURSA W/O US: CPT | Mod: PBBFAC,RT

## 2024-12-31 RX ORDER — TRIAMCINOLONE ACETONIDE 40 MG/ML
40 INJECTION, SUSPENSION INTRA-ARTICULAR; INTRAMUSCULAR ONCE
Status: COMPLETED | OUTPATIENT
Start: 2024-12-31 | End: 2024-12-31

## 2024-12-31 RX ORDER — LIDOCAINE HYDROCHLORIDE 10 MG/ML
5 INJECTION, SOLUTION EPIDURAL; INFILTRATION; INTRACAUDAL; PERINEURAL
Status: COMPLETED | OUTPATIENT
Start: 2024-12-31 | End: 2024-12-31

## 2024-12-31 RX ORDER — KETOROLAC TROMETHAMINE 10 MG/1
10 TABLET, FILM COATED ORAL EVERY 6 HOURS PRN
Qty: 60 TABLET | Refills: 0 | Status: SHIPPED | OUTPATIENT
Start: 2024-12-31 | End: 2025-01-15

## 2024-12-31 RX ADMIN — LIDOCAINE HYDROCHLORIDE 50 MG: 10 INJECTION, SOLUTION EPIDURAL; INFILTRATION; INTRACAUDAL; PERINEURAL at 11:12

## 2024-12-31 RX ADMIN — TRIAMCINOLONE ACETONIDE 40 MG: 40 INJECTION, SUSPENSION INTRA-ARTICULAR; INTRAMUSCULAR at 11:12

## 2024-12-31 NOTE — PROGRESS NOTES
"Subjective:    Patient ID: Winter Ortez is a 59 y.o. female  who presented to [facility] for follow up.    Chief Complaint: Pain of the Left Knee (Patient is here for right knee pain. Patient states her pain radiates from right knee to right hip. )      History of Present Illness:  Winter Ortez who has a history of bilateral knee OA  presented today with with knee pain involving the right knee for the past 2 months. Pain is located anteriorly. Quality of pain is described as Sharp.  Inciting event: none known. Pain is aggravated by walking.  There is occasional associated effusions of the right knee, and also mechanical / locking symptoms. Patient has had prior knee problems; h/o knee OA. Evaluation to date: plain films, ER evaluation, PT evaluation, and Ortho evaluation. Treatment to date: avoidance of activity, topical analgesics, oral analgesics, CSI, PT, and ice/heat. Last CSI of L knee 2/28/24, which provided good pain relief. Denies h/o injections into R knee. Expectations for today's visit includes more advanced imaging.  Occupation includes . PCP is PAOLO White.    Knee Review of Systems:  Swelling?  no  Instability?  yes  Mechanical sx?  yes  <30 min AM stiffness? yes  Limited ROM? no  Fever/Chills? no    Comorbid Conditions:  Overweight         Objective:      Physical Exam:    /76 (BP Location: Left forearm, Patient Position: Sitting)   Pulse 88   Temp 97.3 °F (36.3 °C) (Oral)   Resp 18   Ht 5' 5" (1.651 m)   Wt 78.5 kg (173 lb)   SpO2 99%   BMI 28.79 kg/m²     Ortho/SPM Exam    Appearance:  limping  FWB  Alignment: Left: normal Right: normal   Soft tissue swelling: Left: no Right: no  Effusion: Left:  Negative Right: Positive  Erythema: Left no Right: no  Ecchymosis: Left: no Right: no  Atrophy: Left: no Right: no    Palpation:  Knee Tenderness: Left: None Right: Medial joint line and Lateral joint " line    Range of motion:  Flexion (140): Left:  140 Right: 80  Extension (0): Left: 0 Right: 20    Strength:  Extension: Left 5/5  Pain: no     Right 5/5 Pain: no  Flexion: Left 5/5 Pain: no Right   5/5 Pain: yes        Special Tests:  Ballotable Effusion:Left: Negative Right: Positive   Fluid Wave: Left: Negative Right: Positive   Crepitus: Left: Positive Right: Positive   Patellar grind test: Left: Negative  Right: Negative  Apprehension test: Left: Not performed Right: Not performed   Varus: @ 0, Left Negative Right: Negative.  @ 30, Left Negative  Right Negative   Valgus: @ 0, Left Negative Right: Negative.  @ 30, Left Negative  Right Negative  Lachman: Left: Not performed Right: Not performed   Ant Drawer: Left: Negative Right: Negative   Posterior Drawer: Left: Negative Right: Negative   Dial Test: Left: Not performed Right: Not performed   Aaron: Left: Negative Right: Positive   Apley's: Left: Not performed Right: Not performed  Thessaly's: Left: Not performed Right: Not performed   Noble Compression: Left: Not performed Right: Not performed   Cathie: Left: Not performed Right: Not performed       General appearance: NAD  Peripheral pulses: normal bilaterally   Reflexes: Left: not performed Right not performed   Sensation: normal    Labs:  Last A1c: 5.6     Imaging:   Previous images reviewed.  X-rays ordered and performed today: no  # of views: 4 Laterality: bilateral  My Interpretation:   no fractures or dislocations. Bilateral knees with degenerative changes including medial and lateral joint space narrowing, osteophytes, subchondral sclerosis and bone cysts.       Assessment:        Encounter Diagnoses   Code Name Primary?    M23.91 Internal derangement of right knee Yes    M17.11 Primary osteoarthritis of right knee         Plan:           MDM: Prior external referring provider notes reviewed. Prior external referring provider studies reviewed.   Dx: bilateral Knee Osteoarthritis, chronic in acute,  moderate to severe exacerbation  Treatment Plan: Discussed with patient diagnosis and treatment recommendations. Education provided. Recommend conservative treatment to include: avoidance of aggravating activity, significant modification of daily activities, hot/cold therapies, topical and oral medications, braces, HEP/PT/OT, and injections.   Imaging: prior radiological studies independently reviewed; discussed with patient; agree with radiologist interpretation. Given worsening pain, effusions, and mechanical symptoms of the right knee, and no improvement despite conservative management (physical therapy for 6+ weeks, oral NSAIDs, corticosteroids), as well as exam findings of effusion and decreased ROM, and significant joint line tenderness, will obtain MRI.   Weight Management: is paramount. Recommend at least 10 pounds weight loss if your BMI is 25-29.9. A BMI of <24.9 may provide further relief.  Procedure: Discussed injections as treatment options; discussed injections as treatment options; since conservative measures did not improve symptoms patient consented for CSI today.  Activity: Activity as tolerated; HEP to include aerobic conditioning and strength training with non-painful activity. ROM/STG exercises. Proper footware; assistive devises to avoid limping.   Therapy: s/p formal PT without improvement  Medication: START over-the-counter acetaminophen (Tylenol 1000 mg three times per day as needed)  START PO ketorolac 10mg q6h PRN  for treatment of right knee pain. Discussed with patient not to take any other NSAIDs (ibuprofen: Advil/Motrin; or naproxen: Aleve; or aspirin) at the same time, and not to take ketorolac for more than 5 consecutive days. Please see your primary care physician for further refills.  RTC: after imaging test complete.         Large Joint Aspiration/Injection: R knee    Date/Time: 12/31/2024 10:30 AM    Performed by: Alondra Murphy MD  Authorized by: Jamel Bui MD     Consent Done?:  Yes (Written)  Indications:  Arthritis and pain  Site marked: the procedure site was marked    Prep: patient was prepped and draped in usual sterile fashion      Local anesthesia used?: Yes    Local anesthetic:  Topical anesthetic    Details:  Needle Size:  21 G  Ultrasonic Guidance for needle placement?: No    Approach:  Anterolateral  Location:  Knee  Site:  R knee  Patient tolerance:  Patient tolerated the procedure well with no immediate complications     Staff: Jamel Bui MD    Risks:  Possible complications with the injection include bleeding, infection (.01%), tendon rupture, steroid flare, fat pad or soft tissue atrophy, skin depigmentation, allergic reaction to medications and vasovagal response. (steroid flare treatment is rest, ice, NSAIDs and resolves in 24-36 hours.)    Consent:  No absolute contraindications (cellulitis overlying joint, infection, lack of informed consent, allergy to injection medication, AVN protein or egg allergy for sodium hyaluronate, or history of steroid flare) or relative contraindications (uncontrolled DM2 A1c>10, coagulopathy, INR > 3.5, previous joint replacement or history of AVN).        Description:  The patient was prepped in normal sterile fashion use of chlorhexidine scrub and the appropriate and anatomic landmarks were identified without ultrasound.  Contents of syringe included: 5cc of 1% of lidocaine with 40mg of Kenalog     Post Procedure: Patient alert, and moving all extremities. ROM improved, pain decreased.  Good peripheral pulses, no signs of vascular compromise and range of motion intact.  Aftercare instructions were given to patient at time of discharge.  Relative rest for 3 days-avoiding excess activity.  Place ice on the area for 15 minutes every 4-6 hours. Patient may take Tylenol a 1000 mg b.i.d. or ibuprofen 600 mg t.i.d. for the next 3-4 days if not on medication already and safe to take pending co-morbidities.  Protect the area  for the next 1-8 hours if anesthetic was used.  Avoid excessive activity for the next 3-4 weeks.  ER precautions given for fever, severe joint pain or allergic reaction or other new symptoms related to the joint injection.               Alondra Murphy MD  [facility], [facility]

## 2024-12-31 NOTE — PROGRESS NOTES
Faculty Attestation: Winter Ortez  was seen in [facility] Medicine Clinic Patient seen and evaluated at the time of the visit. History of Present Illness, Physical Exam, and Assessment and Plan reviewed.     Treatment plan is reasonable and appropriate. Compliance with treatment recommendations is important.      No imaging studies were done today.     Procedure note reviewed. Present for entire procedure with the resident. Patient tolerated procedure well.     Jamel Bui MD  Sports Medicine

## 2025-01-15 ENCOUNTER — HOSPITAL ENCOUNTER (OUTPATIENT)
Dept: RADIOLOGY | Facility: HOSPITAL | Age: 60
Discharge: HOME | End: 2025-01-15
Attending: SURGERY
Payer: MEDICAID

## 2025-01-15 DIAGNOSIS — M17.11 PRIMARY OSTEOARTHRITIS OF RIGHT KNEE: ICD-10-CM

## 2025-01-15 DIAGNOSIS — M23.91 INTERNAL DERANGEMENT OF RIGHT KNEE: ICD-10-CM

## 2025-01-15 PROCEDURE — 73721 MRI JNT OF LWR EXTRE W/O DYE: CPT | Mod: TC,RT

## 2025-01-27 ENCOUNTER — TELEPHONE (OUTPATIENT)
Dept: ORTHOPEDICS | Facility: CLINIC | Age: 60
End: 2025-01-27
Payer: MEDICAID

## 2025-01-27 NOTE — TELEPHONE ENCOUNTER
----- Message from Jamel Bui MD sent at 1/27/2025  7:54 AM CST -----  Regarding: RE: MRI results  That is okay if she prefers. She can repeat another injection as needed 3/31/25 onwards.  Thank you!  Jamle Bui  ----- Message -----  From: Tamara Banda MA  Sent: 1/24/2025   2:36 PM CST  To: Jamel Bui MD  Subject: RE: MRI results                                  She states she is doing better from injection. She has an appt for her left knee on 01/31/25 with Be[facility], is it okay if the MRI results are told to her then at that appt?  ----- Message -----  From: Jamel Bui MD  Sent: 1/24/2025   9:53 AM CST  To: Tamara Banda MA  Subject: RE: MRI results                                  If her knee feels well right now (after the injection) she doesn't have to be seen by ortho. We can see her in our fellows clinic for now if she wants to review the MRI.    If her symptoms have returned / did not improve after the injection, she should be referred to ortho instead to discuss surgical options.    Thanks!    Enrico  ----- Message -----  From: Tamara Banda MA  Sent: 1/24/2025   9:40 AM CST  To: Jamel Bui MD  Subject: RE: MRI results                                  Did you want her to follow up with Ortho residents or Sports fellows?  ----- Message -----  From: Jamel Bui MD  Sent: 1/24/2025   7:48 AM CST  To: [facility] Staff  Subject: MRI results                                      Good morning -    For Ms Ortez her MRI has been completed and read by the radiologist. Residents / fellows can review the MRI with the patient in the coming 2-4 weeks and discuss results and next steps with her, and also follow-up on her knee CSI on 12/31/24.     Thank you!  Enrico

## 2025-01-31 ENCOUNTER — OFFICE VISIT (OUTPATIENT)
Dept: ORTHOPEDICS | Facility: CLINIC | Age: 60
End: 2025-01-31
Payer: MEDICAID

## 2025-01-31 VITALS
DIASTOLIC BLOOD PRESSURE: 65 MMHG | BODY MASS INDEX: 28.79 KG/M2 | SYSTOLIC BLOOD PRESSURE: 112 MMHG | HEART RATE: 84 BPM | HEIGHT: 65 IN | TEMPERATURE: 98 F | WEIGHT: 172.81 LBS

## 2025-01-31 DIAGNOSIS — M17.12 OSTEOARTHRITIS OF LEFT KNEE, UNSPECIFIED OSTEOARTHRITIS TYPE: Primary | Chronic | ICD-10-CM

## 2025-01-31 DIAGNOSIS — M17.0 BILATERAL PRIMARY OSTEOARTHRITIS OF KNEE: Primary | ICD-10-CM

## 2025-01-31 DIAGNOSIS — S83.281D ACUTE LATERAL MENISCUS TEAR OF RIGHT KNEE, SUBSEQUENT ENCOUNTER: ICD-10-CM

## 2025-01-31 PROCEDURE — 99213 OFFICE O/P EST LOW 20 MIN: CPT | Mod: PBBFAC

## 2025-01-31 RX ORDER — MELOXICAM 15 MG/1
15 TABLET ORAL DAILY PRN
Qty: 30 TABLET | Refills: 4 | Status: SHIPPED | OUTPATIENT
Start: 2025-01-31 | End: 2026-01-31

## 2025-01-31 NOTE — PROGRESS NOTES
Faculty Attestation: Winter JENIFFER Ortez  was seen in [facility] Medicine Clinic Discussed with resident at the time of the visit. History of Present Illness, Physical Exam, and Assessment and Plan reviewed.     Treatment plan is reasonable and appropriate. Compliance with treatment recommendations is important.      Radiology images independently reviewed and agree with resident interpretation.     No procedure was performed.    Jamel Bui MD  Sports Medicine

## 2025-01-31 NOTE — PROGRESS NOTES
"    Subjective:    Patient ID: Winter Ortez is a 59 y.o. female  who presented to [facility] for follow up..      Chief Complaint: Pain of the Left Knee (Left knee pain. Patient states her pain in left knee comes and goes it isn't constant, she also states the pain in her right knee travels up to the middle of her right thigh. Takes ibuprofen for pain as needed. Attended PT 2x a week and finished and resendez at home exercises. ) and Results of the Right Knee (MRI results of the right knee. )      History of Present Illness:  HPI    Winter Ortez who has a history of b/l knee osteoarthritis osteoarthritis presented today with with knee pain involving both knees for the past 10 years. Pain is located on inside of knee. Quality of pain is described as Sharp. Intermittent in nature.  Inciting event: none known. Pain is aggravated by  after a lot of walking .  Patient has had prior knee problems. Evaluation to date: plain films, ER evaluation, PT evaluation, and Ortho evaluation. Treatment to date: avoidance of activity, topical analgesics, oral analgesics, CSI, PT, and ice/heat. Last CSI of knee on 12/31/24 with great pain relieve. Expectations for today's visit includes discussing MRI results.  Occupation includes . PCP is PAOLO White.    Knee Review of Systems:  Swelling?  no  Instability?  no  Mechanical sx?  no  <30 min AM stiffness? no  Limited ROM? no  Fever/Chills? no    Current Choice of Exercise:  Walking       Objective:      Physical Exam:    /65 (BP Location: Left arm, Patient Position: Sitting)   Pulse 84   Temp 97.8 °F (36.6 °C) (Oral)   Ht 5' 5" (1.651 m)   Wt 78.4 kg (172 lb 12.8 oz)   BMI 28.76 kg/m²       Appearance:  Normal gait/station  FWB  Alignment: Left: normal Right: normal   Soft tissue swelling: Left: no Right: no  Effusion: Left:  Negative Right: Negative  Erythema: Left no Right: no  Ecchymosis: Left: " no Right: no  Atrophy: Left: no Right: no    Palpation:  Knee Tenderness: Left: Medial collateral ligament and lateral collateral ligament Right: Medial collateral ligament    Range of motion:  Flexion (140): Left:  140 Right: 140  Extension (0): Left: 0 Right: 0    Strength:  Extension: Left 5/5  Pain: no     Right 5/5 Pain: no  Flexion: Left 5/5 Pain: no Right   5/5 Pain: no        Special Tests:  Ballotable Effusion:Left: Negative Right: Negative   Fluid Wave: Left: Negative Right: Negative   Crepitus: Left: Negative Right: Negative   Patellar grind test: Left: Negative  Right: Negative  Apprehension test: Left: Negative Right: Negative   Varus: @ 0, Left Negative Right: Negative.  @ 30, Left Negative  Right Negative   Valgus: @ 0, Left Negative Right: Negative.  @ 30, Left Negative  Right Negative  Lachman: Left: Negative Right: Negative   Ant Drawer: Left: Negative Right: Negative   Posterior Drawer: Left: Negative Right: Negative   Dial Test: Left: Not performed Right: Not performed   Aaron: Left: Not performed Right: Not performed   Apley's: Left: Not performed Right: Not performed  Thessaly's: Left: Not performed Right: Not performed   Noble Compression: Left: Not performed Right: Not performed   Cathie: Left: Not performed Right: Not performed       General appearance: NAD  Peripheral pulses: normal bilaterally   Sensation: normal    Labs:  Last A1c: 5.6     Imaging:   Previous images reviewed.  X-rays ordered and performed today: no  # of views: 4 Laterality: bilateral  My Interpretation:  no fractures or dislocations. Bilateral knees with degenerative changes including medial and lateral joint space narrowing, osteophytes, subchondral sclerosis and bone cysts.     Assessment:          1. Bilateral primary osteoarthritis of knee    2. Acute lateral meniscus tear of right knee, subsequent encounter          Plan:         MDM: Prior external referring provider notes reviewed. Prior external referring  provider studies reviewed.   Dx: bilateral Knee Osteoarthritis chronic currently well controlled after CSI 12/31/24.  Lateral meniscus tear on right as shown on MRI 1/15/25  Treatment Plan: Discussed with patient diagnosis, prognosis, and treatment recommendations. Education provided.    topical hot or cold therapy  Over the counter NSAID and/or tylenol provided you do not have contraindications such as but not limited to liver or kidney disease or uncontrolled blood pressure. If you're doctors have told you to to not take them based on your health, do not take them.   Imaging: MRI of right knee with lateral meniscus tear, chondromalacia, and sprain of posterior cruciate ligament.   Weight Management: is paramount. Recommend at least 10 pounds weight loss if your BMI is 25-29.9. A BMI of <24.9 may provide further relief..   Procedure: Discussed CSI/VSI as treatment options; discussed injections as treatment options in future if conservative measures do not improve symptoms.  Activity: Activity as tolerated; HEP to include aerobic conditioning and strength training with non-painful activity. ROM/STG exercises. Proper footware; assistive devises to avoid limping.   Therapy: No formal therapy  Medication: CONTINUE over-the-counter acetaminophen (Tylenol 1000 mg three times per day as needed)  CONTINUE over-the-counter NSAIDs (ibuprofen 200mg three tablets three times a day as needed)  first line treatment with daily acetaminophen. Up to 1000 mg three times daily can be taken; medication precautions given.. Please see your primary care physician for further refills.  RTC: PRN; call if any issues.

## 2025-05-20 ENCOUNTER — OFFICE VISIT (OUTPATIENT)
Dept: ORTHOPEDICS | Facility: CLINIC | Age: 60
End: 2025-05-20
Payer: MEDICAID

## 2025-05-20 VITALS
DIASTOLIC BLOOD PRESSURE: 79 MMHG | BODY MASS INDEX: 29.02 KG/M2 | RESPIRATION RATE: 19 BRPM | OXYGEN SATURATION: 99 % | TEMPERATURE: 99 F | SYSTOLIC BLOOD PRESSURE: 123 MMHG | HEIGHT: 65 IN | WEIGHT: 174.19 LBS | HEART RATE: 77 BPM

## 2025-05-20 DIAGNOSIS — M76.51 PATELLAR TENDINITIS OF RIGHT KNEE: ICD-10-CM

## 2025-05-20 DIAGNOSIS — M17.11 PRIMARY OSTEOARTHRITIS OF RIGHT KNEE: Primary | ICD-10-CM

## 2025-05-20 DIAGNOSIS — M70.51 PES ANSERINUS BURSITIS OF RIGHT KNEE: ICD-10-CM

## 2025-05-20 PROCEDURE — 20610 DRAIN/INJ JOINT/BURSA W/O US: CPT | Mod: PBBFAC,RT

## 2025-05-20 PROCEDURE — 99214 OFFICE O/P EST MOD 30 MIN: CPT | Mod: PBBFAC

## 2025-05-20 RX ORDER — TRIAMCINOLONE ACETONIDE 40 MG/ML
40 INJECTION, SUSPENSION INTRA-ARTICULAR; INTRAMUSCULAR ONCE
Status: COMPLETED | OUTPATIENT
Start: 2025-05-20 | End: 2025-05-20

## 2025-05-20 RX ORDER — LIDOCAINE HYDROCHLORIDE 10 MG/ML
5 INJECTION, SOLUTION EPIDURAL; INFILTRATION; INTRACAUDAL; PERINEURAL
Status: COMPLETED | OUTPATIENT
Start: 2025-05-20 | End: 2025-05-20

## 2025-05-20 RX ORDER — TRIAMCINOLONE ACETONIDE 40 MG/ML
40 INJECTION, SUSPENSION INTRA-ARTICULAR; INTRAMUSCULAR
Status: DISCONTINUED | OUTPATIENT
Start: 2025-05-20 | End: 2025-05-20 | Stop reason: HOSPADM

## 2025-05-20 RX ADMIN — LIDOCAINE HYDROCHLORIDE 50 MG: 10 INJECTION, SOLUTION EPIDURAL; INFILTRATION; INTRACAUDAL; PERINEURAL at 10:05

## 2025-05-20 RX ADMIN — TRIAMCINOLONE ACETONIDE 40 MG: 40 INJECTION, SUSPENSION INTRA-ARTICULAR; INTRAMUSCULAR at 08:05

## 2025-05-20 RX ADMIN — LIDOCAINE HYDROCHLORIDE 5 ML: 10 INJECTION, SOLUTION EPIDURAL; INFILTRATION; INTRACAUDAL; PERINEURAL at 08:05

## 2025-05-20 RX ADMIN — TRIAMCINOLONE ACETONIDE 40 MG: 40 INJECTION, SUSPENSION INTRA-ARTICULAR; INTRAMUSCULAR at 10:05

## 2025-05-20 NOTE — PROGRESS NOTES
Subjective:    Patient ID: Winter Ortez is a 59 y.o. female  who presented to [facility] for follow up.      Chief Complaint: Injections of the Right Knee      History of Present Illness:  HPI    Winter Ortez who has a history of b/l knee osteoarthritis presented today with for follow up on a knee problem involving the right knee for the past 10 years. Pain is located low her kneecap. Quality of pain is described as Sharp, Aching, and Throbbing. Currently a 7 out of 10 pain. Inciting event: none known. Pain is aggravated by any weight bearing, going up and down stairs, kneeling, rising after sitting, squatting, standing, and walking.  Patient has had no prior knee problems.  Patient states that she is currently applying her own Ace bandage along with applying Voltaren gel over her right knee for pain relief.  She states that the Ace wrap does provide better stability in her overall right knee as sometimes she feels like her right knee instable. Evaluation to date: plain films, MRI, and Ortho evaluation. Treatment to date: avoidance of activity, topical analgesics, oral analgesics, CSI, PT, and ice/heat. Expectations for today's visit includes repeat CSI injection on right knee.  Occupation includes , which involves running squatting and ambulating throughout the day.  She is unable to sit on the ground and was requires a chair to sit. PCP is No primary care provider on file..    Patient was previously seen in clinic on 01/31/2025 for follow up of MRI results which showed a lateral meniscus tear on the right knee on 01/15/2025 after completion of outpatient physical therapy.  Patient previously received a CSI injection on the right knee on 12/3/24 and was given p.o. Toradol 10 mg q.6 hours PRN for pain relief for 5 days.  She states that any type of p.o. medication does not provide adequate pain relief compared to her applying Voltaren gel  "over her right knee and applying an Ace bandage for stability.  She states that she has tried a neoprene knee brace but states that her Ace bandage works better for her.  She is also have completed physical therapy in the past, recently finishing in in 01/2025.  She has been doing home exercise therapy along with aerobic therapy consisting of strength training of her lower extremities daily for the past week which has improved her mobility and overall pain. She states she has upcoming weddings this summer/fall and plans to attend all of them.  She wants her overall mobility and overall pain moderately controlled so that she can attend all her wedding plans for the rest of the year.    Knee Review of Systems:  Swelling?  yes  Instability?  yes  Mechanical sx?  yes  <30 min AM stiffness? no  Limited ROM? no  Fever/Chills? no    Current Choice of Exercise:  Walking, Cycling, and Aqua aerobics    Review of Systems   Constitutional: Negative for chills, fever and malaise/fatigue.   Musculoskeletal:  Positive for arthritis (right knee), joint pain (right knee) and joint swelling (right knee). Negative for stiffness.          Objective:      Physical Exam:    /79   Pulse 77   Temp 98.7 °F (37.1 °C) (Oral)   Resp 19   Ht 5' 5" (1.651 m)   Wt 79 kg (174 lb 2.6 oz)   SpO2 99%   BMI 28.98 kg/m²     Ortho/SPM Exam    Appearance:  antalgic  Alignment: Left: normal Right: normal   Soft tissue swelling: Left: no Right: yes  Effusion: Left:  Negative Right: Negative  Erythema: Left no Right: no  Ecchymosis: Left: no Right: no  Atrophy: Left: no Right: yes over the right quadriceps    Palpation:  Knee Tenderness: Left: None Right: Patellar tendon and Pes Anserine bursa    Range of motion:  Flexion (140): Left:  140 Right: 140  Extension (0): Left: 0 Right: 0    Strength:  Extension: Left 5/5  Pain: no     Right 5/5 Pain: no  Flexion: Left 5/5 Pain: no Right   5/5 Pain: no      Special Tests:  Lachman: Left: Negative " Right: Negative   Thessaly's: Left: Negative Right: Negative       General appearance: NAD  Sensation: normal    Labs:  Last A1c: 5.6     Imaging:   Previous images reviewed.  X-rays ordered and performed today: no    Assessment:        Encounter Diagnoses   Code Name Primary?    M17.11 Primary osteoarthritis of right knee Yes    M70.51 Pes anserinus bursitis of right knee     M76.51 Patellar tendinitis of right knee         Plan:           No orders of the defined types were placed in this encounter.    Medications Ordered This Encounter   Medications    LIDOcaine (PF) 10 mg/ml (1%) injection 50 mg    triamcinolone acetonide injection 40 mg       MDM: Prior external referring provider notes reviewed. Prior external referring provider studies reviewed.   Dx: right Knee Osteoarthritis - acute on chronic moderate exacerbation; acute Pes anserine bursitis of the right knee; right patellar tendinitis - acute exacerbation  Treatment Plan: Discussed with patient diagnosis, prognosis, and treatment recommendations. Education provided.    Outpatient physical therapy for 12 weeks, 3 times a week; topical NSAIDs and knee bracing  Imaging: prior radiological studies independently reviewed; discussed with patient; agree with radiologist interpretation.   Weight Management: is paramount. Recommend at least 10 pounds weight loss if your BMI is 25-29.9. A BMI of <24.9 may provide further relief..   Procedure: Discussed CSI/VSI as treatment options; discussed injections as treatment options; since conservative measures did not improve symptoms patient consented for CSI today.  Activity: Activity as tolerated; HEP to include aerobic conditioning and strength training with non-painful activity. ROM/STG exercises. Proper footware; assistive devises to avoid limping.   Therapy: Physical Therapy  Medication: START using chot pat knee brace   CONTINUE Voltaren Gel 1% as prescribed  CONTINUE over-the-counter NSAIDs (ibuprofen 200mg three  tablets three times a day as needed). Please see your primary care physician for further refills.  RTC: 4 wks for evaluation of Pes anserine bursitis of the right knee; will plan for steroid injection over right Pes anserine bursa at next clinic visit.         Gray De La Torre MD   [facility]  05/20/2025      Large Joint Aspiration/Injection: R knee    Date/Time: 5/20/2025 8:30 AM    Performed by: Gray De La Torre MD  Authorized by: Gray De La Torre MD    Consent Done?:  Yes (Written)  Indications:  Arthritis  Site marked: the procedure site was marked    Timeout: prior to procedure the correct patient, procedure, and site was verified    Prep: patient was prepped and draped in usual sterile fashion      Local anesthesia used?: Yes    Local anesthetic:  Topical anesthetic    Details:  Needle Size:  21 G  Location:  Knee  Site:  R knee  Medications:  5 mL 1% Lidocaine - 5mL; 40 mg triamcinolone acetonide 40 mg/mL  Patient tolerance:  Patient tolerated the procedure well with no immediate complications     Staff: Mirlande Conley MD     Risks:  Possible complications with the injection include bleeding, infection (.01%), tendon rupture, steroid flare, fat pad or soft tissue atrophy, skin depigmentation, allergic reaction to medications and vasovagal response. (steroid flare treatment is rest, ice, NSAIDs and resolves in 24-36 hours.)    Consent:  No absolute contraindications (cellulitis overlying joint, infection, lack of informed consent, allergy to injection medication, AVN protein or egg allergy for sodium hyaluronate, or history of steroid flare) or relative contraindications (uncontrolled DM2 A1c>10, coagulopathy, INR > 3.5, previous joint replacement or history of AVN).        Description:  The patient was prepped in normal sterile fashion use of chlorhexidine scrub and the appropriate and anatomic landmarks were identified without ultrasound.  Contents of syringe included: 5cc of 1% of lidocaine with  40mg of Kenalog     Post Procedure: Patient alert, and moving all extremities. ROM improved, pain decreased.  Good peripheral pulses, no signs of vascular compromise and range of motion intact.  Aftercare instructions were given to patient at time of discharge.  Relative rest for 3 days-avoiding excess activity.  Place ice on the area for 15 minutes every 4-6 hours. Patient may take Tylenol a 1000 mg b.i.d. or ibuprofen 600 mg t.i.d. for the next 3-4 days if not on medication already and safe to take pending co-morbidities.  Protect the area for the next 1-8 hours if anesthetic was used.  Avoid excessive activity for the next 3-4 weeks.  ER precautions given for fever, severe joint pain or allergic reaction or other new symptoms related to the joint injection.

## 2025-06-16 NOTE — PROGRESS NOTES
"Subjective:    Patient ID: Winter Ortez is a 59 y.o. female  who presented to [facility] for follow up.      Chief Complaint: Pain of the Right Knee      History of Present Illness:  Winter Ortez is a 59-year-old female who presents to clinic today for follow up on right knee pain.  He has a known past medical history of right knee osteoarthritis.  She did receive a corticosteroid injection about 1 month ago and after injection she still has some pain below her medial joint line.  She is shows him to follow up with me for possible pes anserine bursitis of the right knee. .  Today, she reports her pain is a 0/10 in his doing very well.  She reports that she is attending formal physical therapy which is improving her with the symptoms.  She has no other complaints today.      Knee Review of Systems:  Swelling?  no  Instability?  no  Mechanical sx?  no  <30 min AM stiffness? no  Limited ROM? no  Fever/Chills? no    Comorbid Conditions         Objective:      Physical Exam:    /76 (Patient Position: Sitting)   Pulse 77   Temp 97.6 °F (36.4 °C)   Ht 5' 5" (1.651 m)   Wt 77.2 kg (170 lb 3.1 oz)   BMI 28.32 kg/m²     Ortho/SPM Exam    Appearance:  Normal gait/station  FWB  Alignment: Left: normal Right: normal   Soft tissue swelling: Left: no Right: no  Effusion: Left:  Negative Right: Negative  Erythema: Left no Right: no  Ecchymosis: Left: no Right: no  Atrophy: Left: no Right: no    Palpation:  Knee Tenderness: Left: None Right: None    Range of motion:  Flexion (140): Left:  140 Right: 140  Extension (0): Left: 0 Right: 0    Strength:  Extension: Left 5/5  Pain: no     Right 5/5 Pain: no  Flexion: Left 5/5 Pain: no Right   5/5 Pain: no        Special Tests:  Ballotable Effusion:Left: Negative Right: Negative   Fluid Wave: Left: Negative Right: Negative   Crepitus: Left: Negative Right: Negative   Patellar grind test: Left: Negative  Right: " Negative  Apprehension test: Left: Negative Right: Negative   Varus: @ 0, Left Negative Right: Negative.  @ 30, Left Negative  Right Negative   Valgus: @ 0, Left Negative Right: Negative.  @ 30, Left Negative  Right Negative  Lachman: Left: Negative Right: Negative   Ant Drawer: Left: Negative Right: Negative   Posterior Drawer: Left: Negative Right: Negative       General appearance: NAD  Peripheral pulses: normal bilaterally   Reflexes: Left: normal Right normal   Sensation: normal    Labs:  Last A1c: 5.6     Imaging:   Previous images reviewed.  X-rays ordered and performed today: no      Assessment:        Encounter Diagnoses   Code Name Primary?    M17.11 Primary osteoarthritis of right knee Yes    M70.51 Pes anserinus bursitis of right knee         Plan:   Dx: right Knee Osteoarthritis, improving  Treatment Plan: : Discussed with patient diagnosis and treatment recommendations. Handout given. Recommend conservative treatment to include: avoidance of aggravating activity, significant modification of daily activities, hot/cold therapies, topical and oral medications, braces, HEP/PT/OT, and injections.   Patient here for follow up on her right knee osteoarthritis with possible concomitant pes anserine bursitis.  Patient reports that she is currently pain-free at this time.  No tenderness to palpation over the pes anserine bursa.  Counseled patient to continue her formal physical therapy and home exercise program.  Counseled patient to continue using oral and topical medications for pain relief.  We will see patient back as needed.  Imaging: prior radiological studies independently reviewed; discussed with patient; agree with radiologist interpretation.   Weight Management: is paramount. recommend at least 10% of total body weight loss if your bmi is 30-34.9. A bmi 24.9 or less may provide further relief..   Procedure: Discussed CSI/VSI as treatment options; discussed injections as treatment options in future if  conservative measures do not improve symptoms.  Activity: Activity as tolerated; HEP to include aerobic conditioning and strength training with non-painful activity. ROM/STG exercises. Proper footware; assistive devises to avoid limping.   Therapy: Physical Therapy  Medication: CONTINUE over-the-counter acetaminophen (Tylenol 1000 mg three times per day as needed)  CONTINUE Voltaren Gel 1% as prescribed  CONTINUE over-the-counter NSAIDs (ibuprofen 200mg three tablets three times a day as needed). Please see your primary care physician for further refills.  RTC: PRN; call if any issues.           Nigel Garduno D.O.  Sports Medicine Fellow

## 2025-06-17 ENCOUNTER — OFFICE VISIT (OUTPATIENT)
Dept: ORTHOPEDICS | Facility: CLINIC | Age: 60
End: 2025-06-17
Payer: MEDICAID

## 2025-06-17 VITALS
DIASTOLIC BLOOD PRESSURE: 76 MMHG | SYSTOLIC BLOOD PRESSURE: 130 MMHG | TEMPERATURE: 98 F | BODY MASS INDEX: 28.36 KG/M2 | HEIGHT: 65 IN | HEART RATE: 77 BPM | WEIGHT: 170.19 LBS

## 2025-06-17 DIAGNOSIS — M17.11 PRIMARY OSTEOARTHRITIS OF RIGHT KNEE: Primary | ICD-10-CM

## 2025-06-17 DIAGNOSIS — M70.51 PES ANSERINUS BURSITIS OF RIGHT KNEE: ICD-10-CM

## 2025-06-17 PROCEDURE — 99213 OFFICE O/P EST LOW 20 MIN: CPT | Mod: PBBFAC | Performed by: STUDENT IN AN ORGANIZED HEALTH CARE EDUCATION/TRAINING PROGRAM

## 2025-06-20 DIAGNOSIS — M81.0 AGE-RELATED OSTEOPOROSIS WITHOUT CURRENT PATHOLOGICAL FRACTURE: Primary | ICD-10-CM

## 2025-08-14 ENCOUNTER — HOSPITAL ENCOUNTER (EMERGENCY)
Facility: HOSPITAL | Age: 60
Discharge: HOME | End: 2025-08-14
Attending: EMERGENCY MEDICINE
Payer: MEDICAID

## 2025-08-14 VITALS
WEIGHT: 170 LBS | DIASTOLIC BLOOD PRESSURE: 76 MMHG | SYSTOLIC BLOOD PRESSURE: 134 MMHG | BODY MASS INDEX: 28.32 KG/M2 | RESPIRATION RATE: 20 BRPM | OXYGEN SATURATION: 97 % | HEART RATE: 75 BPM | HEIGHT: 65 IN | TEMPERATURE: 98 F

## 2025-08-14 DIAGNOSIS — N30.00 ACUTE CYSTITIS WITHOUT HEMATURIA: ICD-10-CM

## 2025-08-14 DIAGNOSIS — R07.89 CHEST WALL PAIN: Primary | ICD-10-CM

## 2025-08-14 DIAGNOSIS — R55 SYNCOPE: ICD-10-CM

## 2025-08-14 LAB
ALBUMIN SERPL-MCNC: 3.6 G/DL (ref 3.5–5)
ALBUMIN/GLOB SERPL: 0.9 RATIO (ref 1.1–2)
ALP SERPL-CCNC: 91 UNIT/L (ref 40–150)
ALT SERPL-CCNC: 11 UNIT/L (ref 0–55)
ANION GAP SERPL CALC-SCNC: 9 MEQ/L
AST SERPL-CCNC: 15 UNIT/L (ref 11–45)
BACTERIA #/AREA URNS AUTO: ABNORMAL /HPF
BASOPHILS # BLD AUTO: 0.04 X10(3)/MCL
BASOPHILS NFR BLD AUTO: 0.5 %
BILIRUB SERPL-MCNC: 0.2 MG/DL
BILIRUB UR QL STRIP.AUTO: NEGATIVE
BUN SERPL-MCNC: 12.2 MG/DL (ref 9.8–20.1)
CALCIUM SERPL-MCNC: 9.5 MG/DL (ref 8.4–10.2)
CHLORIDE SERPL-SCNC: 105 MMOL/L (ref 98–107)
CLARITY UR: ABNORMAL
CO2 SERPL-SCNC: 29 MMOL/L (ref 22–29)
COLOR UR AUTO: ABNORMAL
CREAT SERPL-MCNC: 0.75 MG/DL (ref 0.55–1.02)
CREAT/UREA NIT SERPL: 16
EOSINOPHIL # BLD AUTO: 0.24 X10(3)/MCL (ref 0–0.9)
EOSINOPHIL NFR BLD AUTO: 2.7 %
ERYTHROCYTE [DISTWIDTH] IN BLOOD BY AUTOMATED COUNT: 12.7 % (ref 11.5–17)
GFR SERPLBLD CREATININE-BSD FMLA CKD-EPI: >60 ML/MIN/1.73/M2
GLOBULIN SER-MCNC: 4.2 GM/DL (ref 2.4–3.5)
GLUCOSE SERPL-MCNC: 119 MG/DL (ref 74–100)
GLUCOSE UR QL STRIP: NEGATIVE
HCT VFR BLD AUTO: 39 % (ref 37–47)
HGB BLD-MCNC: 13.1 G/DL (ref 12–16)
HGB UR QL STRIP: NEGATIVE
IMM GRANULOCYTES # BLD AUTO: 0.01 X10(3)/MCL (ref 0–0.04)
IMM GRANULOCYTES NFR BLD AUTO: 0.1 %
KETONES UR QL STRIP: NEGATIVE
LEUKOCYTE ESTERASE UR QL STRIP: ABNORMAL
LYMPHOCYTES # BLD AUTO: 2.25 X10(3)/MCL (ref 0.6–4.6)
LYMPHOCYTES NFR BLD AUTO: 25.8 %
MCH RBC QN AUTO: 29.2 PG (ref 27–31)
MCHC RBC AUTO-ENTMCNC: 33.6 G/DL (ref 33–36)
MCV RBC AUTO: 86.9 FL (ref 80–94)
MONOCYTES # BLD AUTO: 0.59 X10(3)/MCL (ref 0.1–1.3)
MONOCYTES NFR BLD AUTO: 6.8 %
NEUTROPHILS # BLD AUTO: 5.6 X10(3)/MCL (ref 2.1–9.2)
NEUTROPHILS NFR BLD AUTO: 64.1 %
NITRITE UR QL STRIP: NEGATIVE
NRBC BLD AUTO-RTO: 0 %
PH UR STRIP: 7 [PH]
PLATELET # BLD AUTO: 291 X10(3)/MCL (ref 130–400)
PMV BLD AUTO: 10 FL (ref 7.4–10.4)
POTASSIUM SERPL-SCNC: 4.1 MMOL/L (ref 3.5–5.1)
PROT SERPL-MCNC: 7.8 GM/DL (ref 6.4–8.3)
PROT UR QL STRIP: NEGATIVE
RBC # BLD AUTO: 4.49 X10(6)/MCL (ref 4.2–5.4)
RBC #/AREA URNS AUTO: ABNORMAL /HPF
SODIUM SERPL-SCNC: 143 MMOL/L (ref 136–145)
SP GR UR STRIP.AUTO: 1.01 (ref 1–1.03)
SQUAMOUS #/AREA URNS AUTO: ABNORMAL /HPF
TROPONIN I SERPL HS-MCNC: <3 NG/L
UROBILINOGEN UR STRIP-ACNC: 0.2
WBC # BLD AUTO: 8.73 X10(3)/MCL (ref 4.5–11.5)
WBC #/AREA URNS AUTO: ABNORMAL /HPF
WBC CLUMPS UR QL AUTO: ABNORMAL

## 2025-08-14 PROCEDURE — 93010 ELECTROCARDIOGRAM REPORT: CPT | Mod: ,,, | Performed by: INTERNAL MEDICINE

## 2025-08-14 PROCEDURE — 81003 URINALYSIS AUTO W/O SCOPE: CPT | Performed by: EMERGENCY MEDICINE

## 2025-08-14 PROCEDURE — 85025 COMPLETE CBC W/AUTO DIFF WBC: CPT | Performed by: EMERGENCY MEDICINE

## 2025-08-14 PROCEDURE — 84484 ASSAY OF TROPONIN QUANT: CPT | Performed by: EMERGENCY MEDICINE

## 2025-08-14 PROCEDURE — 87086 URINE CULTURE/COLONY COUNT: CPT | Performed by: EMERGENCY MEDICINE

## 2025-08-14 PROCEDURE — 25000003 PHARM REV CODE 250: Performed by: EMERGENCY MEDICINE

## 2025-08-14 PROCEDURE — 93005 ELECTROCARDIOGRAM TRACING: CPT

## 2025-08-14 PROCEDURE — 99285 EMERGENCY DEPT VISIT HI MDM: CPT | Mod: 25

## 2025-08-14 PROCEDURE — 80053 COMPREHEN METABOLIC PANEL: CPT | Performed by: EMERGENCY MEDICINE

## 2025-08-14 RX ORDER — KETOROLAC TROMETHAMINE 10 MG/1
10 TABLET, FILM COATED ORAL EVERY 8 HOURS PRN
Qty: 12 TABLET | Refills: 0 | Status: SHIPPED | OUTPATIENT
Start: 2025-08-14 | End: 2025-08-19

## 2025-08-14 RX ORDER — CEFDINIR 300 MG/1
300 CAPSULE ORAL 2 TIMES DAILY
Qty: 14 CAPSULE | Refills: 0 | Status: SHIPPED | OUTPATIENT
Start: 2025-08-14 | End: 2025-08-21

## 2025-08-14 RX ORDER — OXYCODONE AND ACETAMINOPHEN 5; 325 MG/1; MG/1
1 TABLET ORAL EVERY 6 HOURS PRN
Qty: 12 TABLET | Refills: 0 | Status: SHIPPED | OUTPATIENT
Start: 2025-08-14

## 2025-08-14 RX ORDER — KETOROLAC TROMETHAMINE 10 MG/1
10 TABLET, FILM COATED ORAL
Status: COMPLETED | OUTPATIENT
Start: 2025-08-14 | End: 2025-08-14

## 2025-08-14 RX ADMIN — KETOROLAC TROMETHAMINE 10 MG: 10 TABLET, FILM COATED ORAL at 09:08

## 2025-08-15 LAB
OHS QRS DURATION: 94 MS
OHS QTC CALCULATION: 453 MS

## 2025-08-17 LAB — BACTERIA UR CULT: NORMAL

## 2025-08-18 ENCOUNTER — OFFICE VISIT (OUTPATIENT)
Dept: ORTHOPEDICS | Facility: CLINIC | Age: 60
End: 2025-08-18
Payer: MEDICAID

## 2025-08-18 ENCOUNTER — HOSPITAL ENCOUNTER (OUTPATIENT)
Dept: RADIOLOGY | Facility: HOSPITAL | Age: 60
Discharge: HOME | End: 2025-08-18
Payer: MEDICAID

## 2025-08-18 VITALS
HEART RATE: 83 BPM | TEMPERATURE: 98 F | DIASTOLIC BLOOD PRESSURE: 75 MMHG | WEIGHT: 173.75 LBS | BODY MASS INDEX: 28.95 KG/M2 | HEIGHT: 65 IN | SYSTOLIC BLOOD PRESSURE: 117 MMHG | OXYGEN SATURATION: 99 %

## 2025-08-18 DIAGNOSIS — M17.11 PRIMARY OSTEOARTHRITIS OF RIGHT KNEE: Primary | ICD-10-CM

## 2025-08-18 DIAGNOSIS — R52 PAIN: ICD-10-CM

## 2025-08-18 PROCEDURE — 20610 DRAIN/INJ JOINT/BURSA W/O US: CPT | Mod: PBBFAC,RT

## 2025-08-18 PROCEDURE — 99214 OFFICE O/P EST MOD 30 MIN: CPT | Mod: PBBFAC,25

## 2025-08-18 PROCEDURE — 73564 X-RAY EXAM KNEE 4 OR MORE: CPT | Mod: TC,RT

## 2025-08-18 RX ORDER — LIDOCAINE HYDROCHLORIDE 10 MG/ML
5 INJECTION, SOLUTION EPIDURAL; INFILTRATION; INTRACAUDAL; PERINEURAL
Status: COMPLETED | OUTPATIENT
Start: 2025-08-18 | End: 2025-08-18

## 2025-08-18 RX ORDER — METHYLPREDNISOLONE ACETATE 40 MG/ML
40 INJECTION, SUSPENSION INTRA-ARTICULAR; INTRALESIONAL; INTRAMUSCULAR; SOFT TISSUE
Status: COMPLETED | OUTPATIENT
Start: 2025-08-18 | End: 2025-08-18

## 2025-08-18 RX ADMIN — METHYLPREDNISOLONE ACETATE 40 MG: 40 INJECTION, SUSPENSION INTRA-ARTICULAR; INTRALESIONAL; INTRAMUSCULAR; SOFT TISSUE at 08:08

## 2025-08-18 RX ADMIN — LIDOCAINE HYDROCHLORIDE 50 MG: 10 INJECTION, SOLUTION EPIDURAL; INFILTRATION; INTRACAUDAL; PERINEURAL at 08:08
